# Patient Record
Sex: FEMALE | Race: WHITE | HISPANIC OR LATINO | Employment: UNEMPLOYED | URBAN - METROPOLITAN AREA
[De-identification: names, ages, dates, MRNs, and addresses within clinical notes are randomized per-mention and may not be internally consistent; named-entity substitution may affect disease eponyms.]

---
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
## 2020-01-13 ENCOUNTER — NON-PROVIDER VISIT (OUTPATIENT)
Dept: URGENT CARE | Facility: CLINIC | Age: 20
End: 2020-01-13
Payer: COMMERCIAL

## 2020-01-13 DIAGNOSIS — Z02.1 PRE-EMPLOYMENT DRUG SCREENING: ICD-10-CM

## 2020-01-13 LAB
AMP AMPHETAMINE: NORMAL
BAR BARBITURATES: NORMAL
BZO BENZODIAZEPINES: NORMAL
COC COCAINE: NORMAL
INT CON NEG: NORMAL
INT CON POS: NORMAL
MDMA ECSTASY: NORMAL
MET METHAMPHETAMINES: NORMAL
MTD METHADONE: NORMAL
OPI OPIATES: NORMAL
OXY OXYCODONE: NORMAL
PCP PHENCYCLIDINE: NORMAL
POC URINE DRUG SCREEN OCDRS: NORMAL
THC: NORMAL

## 2020-01-13 PROCEDURE — 80305 DRUG TEST PRSMV DIR OPT OBS: CPT | Performed by: FAMILY MEDICINE

## 2020-03-07 ENCOUNTER — OFFICE VISIT (OUTPATIENT)
Dept: URGENT CARE | Facility: CLINIC | Age: 20
End: 2020-03-07
Payer: COMMERCIAL

## 2020-03-07 VITALS
WEIGHT: 107 LBS | HEIGHT: 59 IN | DIASTOLIC BLOOD PRESSURE: 60 MMHG | OXYGEN SATURATION: 98 % | RESPIRATION RATE: 16 BRPM | BODY MASS INDEX: 21.57 KG/M2 | SYSTOLIC BLOOD PRESSURE: 96 MMHG | TEMPERATURE: 98.7 F | HEART RATE: 97 BPM

## 2020-03-07 DIAGNOSIS — R05.9 COUGH: ICD-10-CM

## 2020-03-07 DIAGNOSIS — J06.9 VIRAL URI WITH COUGH: ICD-10-CM

## 2020-03-07 PROCEDURE — 99214 OFFICE O/P EST MOD 30 MIN: CPT | Performed by: NURSE PRACTITIONER

## 2020-03-07 RX ORDER — PROMETHAZINE HYDROCHLORIDE AND CODEINE PHOSPHATE 6.25; 1 MG/5ML; MG/5ML
5 SYRUP ORAL 4 TIMES DAILY PRN
Qty: 120 ML | Refills: 0 | Status: SHIPPED | OUTPATIENT
Start: 2020-03-07 | End: 2020-03-14

## 2020-03-07 RX ORDER — BENZONATATE 100 MG/1
100 CAPSULE ORAL 3 TIMES DAILY PRN
Qty: 30 CAP | Refills: 0 | Status: SHIPPED | OUTPATIENT
Start: 2020-03-07 | End: 2020-03-26

## 2020-03-07 ASSESSMENT — ENCOUNTER SYMPTOMS
SHORTNESS OF BREATH: 0
VOMITING: 0
HEARTBURN: 0
CHILLS: 0
SPUTUM PRODUCTION: 0
HEMOPTYSIS: 0
RHINORRHEA: 0
NAUSEA: 0
ORTHOPNEA: 0
FEVER: 0
DIARRHEA: 0
SWEATS: 0
HEADACHES: 0
DIZZINESS: 0
SORE THROAT: 1
COUGH: 0
MYALGIAS: 0
CONSTIPATION: 0
MEMORY LOSS: 0
BACK PAIN: 0
WHEEZING: 0
WEIGHT LOSS: 0
TINGLING: 0
PALPITATIONS: 0

## 2020-03-07 ASSESSMENT — COPD QUESTIONNAIRES: COPD: 0

## 2020-03-07 NOTE — PROGRESS NOTES
"Subjective:      Odette Harris is a 19 y.o. female who presents with Cough (x3 days) and Sore Throat            Cough   This is a new problem. The current episode started in the past 7 days. The problem has been unchanged. The cough is non-productive. Associated symptoms include a sore throat. Pertinent negatives include no chest pain, chills, ear congestion, ear pain, fever, headaches, heartburn, hemoptysis, myalgias, nasal congestion, postnasal drip, rash, rhinorrhea, shortness of breath, sweats, weight loss or wheezing. The symptoms are aggravated by lying down. She has tried OTC cough suppressant for the symptoms. The treatment provided no relief. There is no history of asthma, bronchitis, COPD, environmental allergies or pneumonia.       Review of Systems   Constitutional: Negative for chills, fever, malaise/fatigue and weight loss.   HENT: Positive for congestion and sore throat. Negative for ear pain, postnasal drip and rhinorrhea.    Respiratory: Negative for cough, hemoptysis, sputum production, shortness of breath and wheezing.    Cardiovascular: Negative for chest pain, palpitations, orthopnea and leg swelling.   Gastrointestinal: Negative for constipation, diarrhea, heartburn, nausea and vomiting.   Musculoskeletal: Negative for back pain, joint pain and myalgias.   Skin: Negative for rash.   Neurological: Negative for dizziness, tingling and headaches.   Endo/Heme/Allergies: Negative for environmental allergies.   Psychiatric/Behavioral: Negative for memory loss and suicidal ideas.   All other systems reviewed and are negative.         Objective:     BP (!) 96/60 (BP Location: Right arm, Patient Position: Sitting, BP Cuff Size: Adult)   Pulse 97   Temp 37.1 °C (98.7 °F) (Temporal)   Resp 16   Ht 1.499 m (4' 11\")   Wt 48.5 kg (107 lb)   LMP 02/22/2020 (Exact Date)   SpO2 98%   Breastfeeding No   BMI 21.61 kg/m²      Physical Exam  Vitals signs reviewed.   Constitutional:       General: She " is not in acute distress.     Appearance: Normal appearance.   HENT:      Head: Normocephalic and atraumatic.      Right Ear: Tympanic membrane, ear canal and external ear normal.      Left Ear: Tympanic membrane, ear canal and external ear normal.      Nose: Congestion and rhinorrhea present. No mucosal edema.      Right Sinus: No maxillary sinus tenderness or frontal sinus tenderness.      Left Sinus: No maxillary sinus tenderness or frontal sinus tenderness.   Eyes:      Pupils: Pupils are equal, round, and reactive to light.   Neck:      Musculoskeletal: Normal range of motion and neck supple.   Cardiovascular:      Rate and Rhythm: Normal rate and regular rhythm.      Pulses: Normal pulses.      Heart sounds: No friction rub. No gallop.    Pulmonary:      Effort: Pulmonary effort is normal. No respiratory distress.      Breath sounds: Normal breath sounds.   Abdominal:      General: Bowel sounds are normal. There is no distension.      Palpations: Abdomen is soft. There is no mass.   Musculoskeletal: Normal range of motion.         General: No tenderness.      Right lower leg: No edema.      Left lower leg: No edema.   Skin:     General: Skin is warm and dry.   Neurological:      Mental Status: She is alert and oriented to person, place, and time.   Psychiatric:         Mood and Affect: Mood normal.         Behavior: Behavior normal.                 Assessment/Plan:       1. Cough  2. Viral URI with cough  Clinical history and physical exam consistent with probable viral infection. Patient without evidence of superimposed bacterial infection. Patient able to tolerate PO intake with adequate hydration status. Discussed with patient that there are no indications for antibiotics at this time, and they can expect the full course of this illness to run 7-10 days.  Patient education and return precautions provided.  - Symptomatic management  - Encourage PO intake  - Ibuprofen / Tylenol, PRN for fever, pain or  myalgias   - Robitussin DM / Cepacol / Saline Nasal Spray / Humidifier  -Honey or OTC guaifensen/dextromethorphan for cough  -Sudafed, oxymetazoline (Afrin, Zicam) caution given to not use oxymetazoline more than 3-4 days as rebound congestion my develop.   - Recommend follow-up if symptoms do not improve within 7-10 days or if they develop new or worsening symptoms.  Sedating effects of cough syrup discussed. Checked patient's  and find no evidence of narcotic misuse.    - benzonatate (TESSALON) 100 MG Cap; Take 1 Cap by mouth 3 times a day as needed for Cough.  Dispense: 30 Cap; Refill: 0  - promethazine-codeine (PHENERGAN-CODEINE) 6.25-10 MG/5ML Syrup; Take 5 mL by mouth 4 times a day as needed for Cough (Max 30 mls in 24 hrs.) for up to 7 days.  Dispense: 120 mL; Refill: 0

## 2020-03-26 ENCOUNTER — OCCUPATIONAL MEDICINE (OUTPATIENT)
Dept: URGENT CARE | Facility: PHYSICIAN GROUP | Age: 20
End: 2020-03-26
Payer: COMMERCIAL

## 2020-03-26 ENCOUNTER — HOSPITAL ENCOUNTER (OUTPATIENT)
Dept: RADIOLOGY | Facility: MEDICAL CENTER | Age: 20
End: 2020-03-26
Attending: NURSE PRACTITIONER
Payer: COMMERCIAL

## 2020-03-26 VITALS
HEIGHT: 59 IN | HEART RATE: 64 BPM | RESPIRATION RATE: 16 BRPM | BODY MASS INDEX: 21.77 KG/M2 | WEIGHT: 108 LBS | SYSTOLIC BLOOD PRESSURE: 110 MMHG | TEMPERATURE: 98 F | DIASTOLIC BLOOD PRESSURE: 60 MMHG | OXYGEN SATURATION: 100 %

## 2020-03-26 DIAGNOSIS — Z02.1 PRE-EMPLOYMENT DRUG SCREENING: ICD-10-CM

## 2020-03-26 DIAGNOSIS — M79.641 RIGHT HAND PAIN: ICD-10-CM

## 2020-03-26 DIAGNOSIS — S60.221A CONTUSION OF RIGHT HAND, INITIAL ENCOUNTER: ICD-10-CM

## 2020-03-26 LAB
AMP AMPHETAMINE: NORMAL
BAR BARBITURATES: NORMAL
BZO BENZODIAZEPINES: NORMAL
COC COCAINE: NORMAL
INT CON NEG: NEGATIVE
INT CON POS: POSITIVE
MDMA ECSTASY: NORMAL
MET METHAMPHETAMINES: NORMAL
MTD METHADONE: NORMAL
OPI OPIATES: NORMAL
OXY OXYCODONE: NORMAL
PCP PHENCYCLIDINE: NORMAL
POC URINE DRUG SCREEN OCDRS: NORMAL
THC: NORMAL

## 2020-03-26 PROCEDURE — 80305 DRUG TEST PRSMV DIR OPT OBS: CPT | Mod: 29 | Performed by: NURSE PRACTITIONER

## 2020-03-26 PROCEDURE — 99213 OFFICE O/P EST LOW 20 MIN: CPT | Mod: 29 | Performed by: NURSE PRACTITIONER

## 2020-03-26 PROCEDURE — 73130 X-RAY EXAM OF HAND: CPT | Mod: RT

## 2020-03-26 ASSESSMENT — PAIN SCALES - GENERAL: PAINLEVEL: 6=MODERATE PAIN

## 2020-03-26 ASSESSMENT — ENCOUNTER SYMPTOMS
TINGLING: 0
SENSORY CHANGE: 0
MYALGIAS: 1
FOCAL WEAKNESS: 0

## 2020-03-26 NOTE — PROGRESS NOTES
Subjective:      Odette Harris is a 19 y.o. female who presents with Hand Injury (DOI 3/25/2020 R hand )            HPI DOI: 3/25/20. Patient is 20 year old female presenting with right hand pain after hitting it hard against a table while working. She has 6/10 pain to dorsum of hand. No radiation of this pain at this time. She has no focal weakness, no paresthesia. She is right hand dominant. No second job and no previous history with this hand. She has not done any treatment for this.    Patient has no known allergies.  Current Outpatient Medications on File Prior to Visit   Medication Sig Dispense Refill   • benzonatate (TESSALON) 100 MG Cap Take 1 Cap by mouth 3 times a day as needed for Cough. (Patient not taking: Reported on 3/26/2020) 30 Cap 0     No current facility-administered medications on file prior to visit.      Social History     Socioeconomic History   • Marital status: Single     Spouse name: Not on file   • Number of children: Not on file   • Years of education: Not on file   • Highest education level: Not on file   Occupational History   • Not on file   Social Needs   • Financial resource strain: Not on file   • Food insecurity     Worry: Not on file     Inability: Not on file   • Transportation needs     Medical: Not on file     Non-medical: Not on file   Tobacco Use   • Smoking status: Never Smoker   • Smokeless tobacco: Never Used   Substance and Sexual Activity   • Alcohol use: Not Currently   • Drug use: Never   • Sexual activity: Not on file   Lifestyle   • Physical activity     Days per week: Not on file     Minutes per session: Not on file   • Stress: Not on file   Relationships   • Social connections     Talks on phone: Not on file     Gets together: Not on file     Attends Jainism service: Not on file     Active member of club or organization: Not on file     Attends meetings of clubs or organizations: Not on file     Relationship status: Not on file   • Intimate partner violence  "    Fear of current or ex partner: Not on file     Emotionally abused: Not on file     Physically abused: Not on file     Forced sexual activity: Not on file   Other Topics Concern   • Behavioral problems Not Asked   • Interpersonal relationships Not Asked   • Sad or not enjoying activities Not Asked   • Suicidal thoughts Not Asked   • Poor school performance Not Asked   • Reading difficulties Not Asked   • Speech difficulties Not Asked   • Writing difficulties Not Asked   • Inadequate sleep Not Asked   • Excessive TV viewing Not Asked   • Excessive video game use Not Asked   • Inadequate exercise Not Asked   • Sports related Not Asked   • Poor diet Not Asked   • Family concerns for drug/alcohol abuse Not Asked   • Poor oral hygiene Not Asked   • Bike safety Not Asked   • Family concerns vehicle safety Not Asked   Social History Narrative   • Not on file     Breast Cancer-related family history is not on file.      Review of Systems   Musculoskeletal: Positive for joint pain and myalgias.   Neurological: Negative for tingling, sensory change and focal weakness.          Objective:     /60   Pulse 64   Temp 36.7 °C (98 °F) (Temporal)   Resp 16   Ht 1.499 m (4' 11\")   Wt 49 kg (108 lb)   SpO2 100%   BMI 21.81 kg/m²      Physical Exam  Constitutional:       Appearance: Normal appearance. She is not ill-appearing.   Cardiovascular:      Rate and Rhythm: Normal rate and regular rhythm.      Heart sounds: No murmur.   Pulmonary:      Effort: Pulmonary effort is normal.      Breath sounds: Normal breath sounds.   Musculoskeletal:      Right hand: She exhibits tenderness, bony tenderness and swelling. She exhibits normal range of motion.        Hands:    Skin:     General: Skin is warm and dry.      Findings: Bruising present.   Neurological:      General: No focal deficit present.      Mental Status: She is alert and oriented to person, place, and time.   Psychiatric:         Mood and Affect: Mood normal. "                 Assessment/Plan:       1. Contusion of right hand, initial encounter     2. Right hand pain  DX-HAND 3+ RIGHT     Negative imaging.  nsaids and icing.  Restrictions per D39

## 2020-03-26 NOTE — LETTER
[facility]  [address] [place] [address]  Phone:  [phone] - Fax:  [phone]   [facility] Progress Report and Disability Certification  Date of Service: 3/26/2020   No Show:  No  Date / Time of Next Visit: 3/31/2020   Claim Information   Patient Name: Oedtte Harris  Claim Number:     Employer: [employer]  Date of Injury: 3/25/2020     Insurer / TPA: Marie Avila  ID / SSN:     Occupation:   Diagnosis: Diagnoses of Contusion of right hand, initial encounter and Right hand pain were pertinent to this visit.    Medical Information   Related to Industrial Injury? Yes    Subjective Complaints:  DOI: 3/25/20. Patient is 20 year old female presenting with right hand pain after hitting it hard against a table while working. She has 6/10 pain to dorsum of hand. No radiation of this pain at this time. She has no focal weakness, no paresthesia. She is right hand dominant. No second job and no previous history with this hand. She has not done any treatment for this.   Objective Findings: Physical Exam  Constitutional:       Appearance: Normal appearance. She is not ill-appearing.   Cardiovascular:      Rate and Rhythm: Normal rate and regular rhythm.      Heart sounds: No murmur.   Pulmonary:      Effort: Pulmonary effort is normal.      Breath sounds: Normal breath sounds.   Musculoskeletal:      Right hand: She exhibits tenderness, bony tenderness and swelling. She exhibits normal range of motion.        Hands:    Skin:     General: Skin is warm and dry.      Findings: Bruising present.   Neurological:      General: No focal deficit present.      Mental Status: She is alert and oriented to person, place, and time.   Psychiatric:         Mood and Affect: Mood normal.        Pre-Existing Condition(s):     Assessment:   Initial Visit    Status: Additional Care Required  Permanent Disability:No    Plan: Medication    Diagnostics: X-ray    Comments:   Imaging negative for fracture    Disability Information   Status: Released to Restricted Duty    From:  3/26/2020  Through: 3/31/2020 Restrictions are: Temporary   Physical Restrictions   Sitting:    Standing:    Stooping:    Bending:      Squatting:    Walking:    Climbing:    Pushing:      Pulling:    Other:    Reaching Above Shoulder (L):   Reaching Above Shoulder (R):       Reaching Below Shoulder (L):    Reaching Below Shoulder (R):      Not to exceed Weight Limits   Carrying(hrs):   Weight Limit(lb):   Lifting(hrs):   Weight  Limit(lb):     Comments:      Repetitive Actions   Hands: i.e. Fine Manipulations from [place] hrs/day  Comments:right hand.   Feet: i.e. Operating Foot Controls:     Driving / Operate Machinery:     Physician Name: OMID CorralesPALFONSO Physician Signature: CRYSTAL Wilson e-Signature: Dr. Hilario Soto, Medical Director   Clinic Name / Location: [address] [address] Clinic Phone Number: Dept: [phone]   Appointment Time: 3:25 Pm Visit Start Time: 3:56 PM   Check-In Time:  3:39 Pm Visit Discharge Time:  4:45 PM   Original-Treating Physician or Chiropractor    Page 2-Insurer/TPA    Page 3-Employer    Page 4-Employee

## 2020-03-26 NOTE — LETTER
"EMPLOYEE’S CLAIM FOR COMPENSATION/ REPORT OF INITIAL TREATMENT  FORM C-4    EMPLOYEE’S CLAIM - PROVIDE ALL INFORMATION REQUESTED   First Name  Odette Last Name  Steven Birthdate                    2000                Sex  female Claim Number   Home Address  [address] Age  19 y.o. Height  1.499 m (4' 11\") Weight  49 kg (108 lb) N     [facility] Zip  [address] Telephone  [phone] (home)    Mailing Address  [address] [facility] [place]  [address] Primary Language Spoken  English    Insurer  [employer]   [employer]   Employee's Occupation (Job Title) When Injury or Occupational Disease Occurred      Employer's Name  [employer]  Telephone  [phone]    Employer Address  [address] [facility] [facility] [address]  Zip  [address]    Date of Injury  3/25/2020               Hour of Injury  4:00 PM Date Employer Notified  3/26/2020 Last Day of Work after Injury or Occupational Disease  3/26/2020 Supervisor to Whom Injury Reported  Monica   Address or Location of Accident (if applicable)  [At Work]   What were you doing at the time of accident? (if applicable)  working hit hand against table    How did this injury or occupational disease occur? (Be specific an answer in detail. Use additional sheet if necessary)  Was Working Fast, Hit my hand against table hard.   If you believe that you have an occupational disease, when did you first have knowledge of the disability and it relationship to your employment?  N/A Witnesses to the Accident  N/A      Nature of Injury or Occupational Disease  Workers' Compensation  Part(s) of Body Injured or Affected  Hand (R), N/A, N/A    I certify that the above is true and correct to the best of my knowledge and that I have provided this information in order to obtain the benefits of [place]’s [place] and " [place]  I hereby authorize any physician, chiropractor, surgeon, practitioner, or other person, any hospital, including [facility] or [facility], any medical service organization, any insurance company, or other institution or organization to release to each other, any medical or other information, including benefits paid or payable, pertinent to this injury or disease, except information relative to diagnosis, treatment and/or counseling for AIDS, psychological conditions, alcohol or controlled substances, for which I must give specific authorization.  A Photostat of this authorization shall be as valid as the original.     Date   Place   Employee’s Signature   THIS REPORT MUST BE COMPLETED AND MAILED WITHIN 3 WORKING DAYS OF TREATMENT   Place  [facility]  Name of Facility  [place]   Date  3/26/2020 Diagnosis  (S60.221A) Contusion of right hand, initial encounter  (M79.641) Right hand pain Is there evidence the injured employee was under the influence of alcohol and/or another controlled substance at the time of accident?   Hour  3:56 PM Description of Injury or Disease  Diagnoses of Contusion of right hand, initial encounter and Right hand pain were pertinent to this visit. No   Treatment  nsaids and icing.  Have you advised the patient to remain off work five days or more? No   X-Ray Findings  Negative   If Yes   From Date  To Date      From information given by the employee, together with medical evidence, can you directly connect this injury or occupational disease as job incurred?  Yes If No Full Duty No Modified Duty  Yes   Is additional medical care by a physician indicated?  Yes If Modified Duty, Specify any Limitations / Restrictions  Restricted work with right hand.   Do you know of any previous injury or disease contributing to this condition or occupational disease?                            No    "  Date  3/26/2020 Print Doctor’s Name WALI Corrales I certify the employer’s copy of  this form was mailed on:   Address  [address] Insurer’s Use Only     [facility] Zip  [address]    Provider’s Tax ID Number  [id number] Telephone  Dept: [phone]        e-CRYSTAL Bustos   e-Signature: Dr. Hilario Soto,   Medical Director Degree  MD        ORIGINAL-TREATING PHYSICIAN OR CHIROPRACTOR    PAGE 2-INSURER/TPA    PAGE 3-EMPLOYER    PAGE 4-EMPLOYEE             Form C-4 (rev.10/07)              BRIEF DESCRIPTION OF RIGHTS AND BENEFITS  (Pursuant to [place] 616C.050)    Notice of Injury or Occupational Disease (Incident Report Form C-1): If an injury or occupational disease (OD) arises out of and in the course of employment, you must provide written notice to your employer as soon as practicable, but no later than 7 days after the accident or OD. Your employer shall maintain a sufficient supply of the required forms.    Claim for Compensation (Form C-4): If medical treatment is sought, the form C-4 is available at the place of initial treatment. A completed \"Claim for Compensation\" (Form C-4) must be filed within 90 days after an accident or OD. The treating physician or chiropractor must, within 3 working days after treatment, complete and mail to the employer, the employer's insurer and third-party , the Claim for Compensation.    Medical Treatment: If you require medical treatment for your on-the-job injury or OD, you may be required to select a physician or chiropractor from a list provided by your workers’ compensation insurer, if it has contracted with an Organization for Managed Care (MCO) or Preferred Provider Organization (PPO) or providers of health care. If your employer has not entered into a contract with an MCO or PPO, you may select a physician or chiropractor from the Panel of Physicians and Chiropractors. Any medical costs related to your industrial " injury or OD will be paid by your insurer.    Temporary Total Disability (TTD): If your doctor has certified that you are unable to work for a period of at least 5 consecutive days, or 5 cumulative days in a 20-day period, or places restrictions on you that your employer does not accommodate, you may be entitled to TTD compensation.    Temporary Partial Disability (TPD): If the wage you receive upon reemployment is less than the compensation for TTD to which you are entitled, the insurer may be required to pay you TPD compensation to make up the difference. TPD can only be paid for a maximum of 24 months.    Permanent Partial Disability (PPD): When your medical condition is stable and there is an indication of a PPD as a result of your injury or OD, within 30 days, your insurer must arrange for an evaluation by a rating physician or chiropractor to determine the degree of your PPD. The amount of your PPD award depends on the date of injury, the results of the PPD evaluation and your age and wage.    Permanent Total Disability (PTD): If you are medically certified by a treating physician or chiropractor as permanently and totally disabled and have been granted a PTD status by your insurer, you are entitled to receive monthly benefits not to exceed 66 2/3% of your average monthly wage. The amount of your PTD payments is subject to reduction if you previously received a PPD award.    Vocational Rehabilitation Services: You may be eligible for vocational rehabilitation services if you are unable to return to the job due to a permanent physical impairment or permanent restrictions as a result of your injury or occupational disease.    Transportation and Per Rusty Reimbursement: You may be eligible for travel expenses and per rusty associated with medical treatment.    Reopening: You may be able to reopen your claim if your condition worsens after claim closure.    Appeal Process: If you disagree with a written  determination issued by the insurer or the insurer does not respond to your request, you may appeal to the Department of Administration, , by following the instructions contained in your determination letter. You must appeal the determination within 70 days from the date of the determination letter at [address], [address], [place], [place] [address], or [address], [address], [place], [place] [address]. If you disagree with the  decision, you may appeal to the Department of Administration, . You must file your appeal within 30 days from the date of the  decision letter at [address], [address], [place], [place] [address], or [address], [address], [place], [place] [address]. If you disagree with a decision of an , you may file a petition for judicial review with the District Court. You must do so within 30 days of the Appeal Officer’s decision. You may be represented by an  at your own expense or you may contact the [facility] for possible representation.    [place]  for Injured Workers (NAIW): If you disagree with a  decision, you may request that NAIW represent you without charge at an  Hearing. For information regarding denial of benefits, you may contact the [facility] at: [address], [address], [place] [address], [phone], or [address], [address], [place] [address], [phone]    To File a Complaint with the Division: If you wish to file a complaint with the  of the Division of Industrial Relations (DIR),  please contact the Workers’ Compensation Section, [address], [address], [place], [place] [address], telephone [phone] [phone], or [address], [address], [place], [place] [address], telephone [phone].    For assistance with Workers’ Compensation Issues: You may contact the Office of the  [facility] Consumer Health Assistance, [address], [address], [place] [address], Toll Free 1-432.800.9109, Web site: http://govcha.[facility].[place].us, E-mail [email or web address].                   __________________________________________________________________                                                     _________        Employee Name / Signature                                                                                                                                              Date                                                                                                                                                                                                     D-2 (rev. 06/18)

## 2020-03-31 ENCOUNTER — OCCUPATIONAL MEDICINE (OUTPATIENT)
Dept: URGENT CARE | Facility: PHYSICIAN GROUP | Age: 20
End: 2020-03-31
Payer: COMMERCIAL

## 2020-03-31 VITALS
HEIGHT: 59 IN | DIASTOLIC BLOOD PRESSURE: 58 MMHG | BODY MASS INDEX: 21.77 KG/M2 | WEIGHT: 108 LBS | TEMPERATURE: 97.7 F | HEART RATE: 75 BPM | RESPIRATION RATE: 16 BRPM | SYSTOLIC BLOOD PRESSURE: 104 MMHG | OXYGEN SATURATION: 99 %

## 2020-03-31 DIAGNOSIS — S60.221D CONTUSION OF RIGHT HAND, SUBSEQUENT ENCOUNTER: ICD-10-CM

## 2020-03-31 PROCEDURE — 99213 OFFICE O/P EST LOW 20 MIN: CPT | Performed by: NURSE PRACTITIONER

## 2020-03-31 ASSESSMENT — ENCOUNTER SYMPTOMS
FEVER: 0
SENSORY CHANGE: 0
TINGLING: 0
CHILLS: 0
MYALGIAS: 0
BRUISES/BLEEDS EASILY: 0
FALLS: 0
WEAKNESS: 0

## 2020-03-31 ASSESSMENT — PAIN SCALES - GENERAL: PAINLEVEL: NO PAIN

## 2020-03-31 NOTE — LETTER
[facility]  [address]  [place] [address]  Phone:  [phone] - Fax:  [phone]   [facility] Progress Report and Disability Certification  Date of Service: 3/31/2020   No Show:  No  Date / Time of Next Visit:  Discharged/MMI   Claim Information   Patient Name: Odette Harris  Claim Number:     Employer: [employer]  Date of Injury: 3/25/2020     Insurer / TPA: Marie Avila  ID / SSN:     Occupation:   Diagnosis: The encounter diagnosis was Contusion of right hand, subsequent encounter.    Medical Information   Related to Industrial Injury? Yes    Subjective Complaints:  DOI 3/25/20. Right hand dorsal contusion has improved. States bruising has improved. Denies pain, numbness/tingling, no weakness in hand. Had performed regular duties yesterday with no difficulty or pain. States ready to return to work, no restrictions.   Objective Findings: A/O x 3. Skin p/w/d, skin sensation intact. Equal hand . No swelling, redness, bruising or deformity.    Pre-Existing Condition(s):     Assessment:   Condition Improved    Status: Discharged /  MMI  Permanent Disability:No    Plan:      Diagnostics:      Comments:       Disability Information   Status: Released to Full Duty    From:     Through:   Restrictions are:     Physical Restrictions   Sitting:    Standing:    Stooping:    Bending:      Squatting:    Walking:    Climbing:    Pushing:      Pulling:    Other:    Reaching Above Shoulder (L):   Reaching Above Shoulder (R):       Reaching Below Shoulder (L):    Reaching Below Shoulder (R):      Not to exceed Weight Limits   Carrying(hrs):   Weight Limit(lb):   Lifting(hrs):   Weight  Limit(lb):     Comments: Discharged/MMI.    Repetitive Actions   Hands: i.e. Fine Manipulations from Grasping:     Feet: i.e. Operating Foot Controls:     Driving / Operate Machinery:     Physician Name: HARVINDER Weir Physician Signature: Ella  DB BLANTON e-Signature: Dr. Hilario Soto, Medical Director   Clinic Name / Location: [address] [address] Clinic Phone Number: Dept: [phone]   Appointment Time: 8:20 Am Visit Start Time: 8:23 AM   Check-In Time:  8:19 Am Visit Discharge Time: 8:50 AM   Original-Treating Physician or Chiropractor    Page 2-Insurer/TPA    Page 3-Employer    Page 4-Employee

## 2020-03-31 NOTE — PROGRESS NOTES
"Subjective:      Odette Harris is a 19 y.o. female who presents with Hand Injury (DOI 3/25/20 R hand better)      DOI 3/25/20. Right hand dorsal contusion has improved. States bruising has improved. Denies pain, numbness/tingling, no weakness in hand. Had performed regular duties yesterday with no difficulty or pain. States ready to return to work, no restrictions.     HPI  PMH: No pertinent past medical history to this problem  MEDS: Medications were reviewed in Epic  ALLERGIES: Allergies were reviewed in Epic  FH: No pertinent family history to this problem         Review of Systems   Constitutional: Negative for chills, fever and malaise/fatigue.   Musculoskeletal: Negative for falls, joint pain and myalgias.   Skin: Negative for itching and rash.   Neurological: Negative for tingling, sensory change and weakness.   Endo/Heme/Allergies: Does not bruise/bleed easily.   All other systems reviewed and are negative.         Objective:     /58   Pulse 75   Temp 36.5 °C (97.7 °F) (Temporal)   Resp 16   Ht 1.499 m (4' 11\")   Wt 49 kg (108 lb)   SpO2 99%   BMI 21.81 kg/m²      Physical Exam  Vitals signs reviewed.   Constitutional:       General: She is awake. She is not in acute distress.     Appearance: Normal appearance. She is well-developed. She is not ill-appearing, toxic-appearing or diaphoretic.   HENT:      Head: Normocephalic.   Eyes:      Pupils: Pupils are equal, round, and reactive to light.   Cardiovascular:      Rate and Rhythm: Normal rate.   Pulmonary:      Effort: Pulmonary effort is normal.   Musculoskeletal:      Right hand: She exhibits normal range of motion, no tenderness, normal two-point discrimination, normal capillary refill, no deformity, no laceration and no swelling. Normal sensation noted. Normal strength noted.   Skin:     General: Skin is warm and dry.      Findings: No abrasion, bruising, ecchymosis, erythema, signs of injury, laceration, rash or wound.   Neurological: "      Mental Status: She is alert and oriented to person, place, and time.   Psychiatric:         Behavior: Behavior is cooperative.         A/O x 3. Skin p/w/d, skin sensation intact. Equal hand . No swelling, redness, bruising or deformity.        Assessment/Plan:       1. Contusion of right hand, subsequent encounter    Discharged/MMI

## 2021-10-14 ENCOUNTER — HOSPITAL ENCOUNTER (OUTPATIENT)
Facility: MEDICAL CENTER | Age: 21
End: 2021-10-14
Attending: PHYSICIAN ASSISTANT
Payer: COMMERCIAL

## 2021-10-14 ENCOUNTER — OFFICE VISIT (OUTPATIENT)
Dept: URGENT CARE | Facility: CLINIC | Age: 21
End: 2021-10-14
Payer: COMMERCIAL

## 2021-10-14 VITALS
TEMPERATURE: 97.8 F | WEIGHT: 115.8 LBS | OXYGEN SATURATION: 96 % | HEART RATE: 96 BPM | HEIGHT: 59 IN | DIASTOLIC BLOOD PRESSURE: 62 MMHG | BODY MASS INDEX: 23.35 KG/M2 | SYSTOLIC BLOOD PRESSURE: 102 MMHG

## 2021-10-14 DIAGNOSIS — N30.01 ACUTE CYSTITIS WITH HEMATURIA: ICD-10-CM

## 2021-10-14 LAB
APPEARANCE UR: ABNORMAL
BILIRUB UR STRIP-MCNC: ABNORMAL MG/DL
COLOR UR AUTO: ABNORMAL
GLUCOSE UR STRIP.AUTO-MCNC: NEGATIVE MG/DL
INT CON NEG: NEGATIVE
INT CON POS: POSITIVE
KETONES UR STRIP.AUTO-MCNC: 80 MG/DL
LEUKOCYTE ESTERASE UR QL STRIP.AUTO: ABNORMAL
NITRITE UR QL STRIP.AUTO: POSITIVE
PH UR STRIP.AUTO: 8.5 [PH] (ref 5–8)
POC URINE PREGNANCY TEST: NEGATIVE
PROT UR QL STRIP: >=300 MG/DL
RBC UR QL AUTO: ABNORMAL
SP GR UR STRIP.AUTO: 1.02
UROBILINOGEN UR STRIP-MCNC: 2 MG/DL

## 2021-10-14 PROCEDURE — 99213 OFFICE O/P EST LOW 20 MIN: CPT | Performed by: PHYSICIAN ASSISTANT

## 2021-10-14 PROCEDURE — 81025 URINE PREGNANCY TEST: CPT | Performed by: PHYSICIAN ASSISTANT

## 2021-10-14 PROCEDURE — 87077 CULTURE AEROBIC IDENTIFY: CPT

## 2021-10-14 PROCEDURE — 81002 URINALYSIS NONAUTO W/O SCOPE: CPT | Performed by: PHYSICIAN ASSISTANT

## 2021-10-14 PROCEDURE — 87086 URINE CULTURE/COLONY COUNT: CPT

## 2021-10-14 PROCEDURE — 87186 SC STD MICRODIL/AGAR DIL: CPT

## 2021-10-14 RX ORDER — NITROFURANTOIN 25; 75 MG/1; MG/1
100 CAPSULE ORAL 2 TIMES DAILY
Qty: 10 CAPSULE | Refills: 0 | Status: SHIPPED | OUTPATIENT
Start: 2021-10-14 | End: 2021-10-19

## 2021-10-14 ASSESSMENT — ENCOUNTER SYMPTOMS
SHORTNESS OF BREATH: 0
NAUSEA: 0
CHILLS: 0
VOMITING: 0
FLANK PAIN: 0
FEVER: 0
PALPITATIONS: 0
DIZZINESS: 0
ABDOMINAL PAIN: 1
BLURRED VISION: 0

## 2021-10-14 NOTE — PROGRESS NOTES
"Jose Manuel Harris is a 21 y.o. female who presents with Dysuria (x 3 days with abdominal pain)    UTI  The current episode started in the past 7 days (started ~ 2 days ago but got worse yesterday). The problem occurs constantly. Associated symptoms include abdominal pain and urinary symptoms (dysuria, urgency, frequency). Pertinent negatives include no chest pain, chills, fever, nausea or vomiting. Nothing aggravates the symptoms. She has tried drinking, NSAIDs and heat for the symptoms. The treatment provided mild relief.   She also notes that she started her menstrual cycle yesterday.    Review of Systems   Constitutional: Negative for chills, fever and malaise/fatigue.   Eyes: Negative for blurred vision.   Respiratory: Negative for shortness of breath.    Cardiovascular: Negative for chest pain and palpitations.   Gastrointestinal: Positive for abdominal pain. Negative for nausea and vomiting.   Genitourinary: Positive for dysuria, frequency and urgency. Negative for flank pain.   Neurological: Negative for dizziness.       PMH:  has no past medical history on file.  MEDS:   Current Outpatient Medications:   •  nitrofurantoin (MACROBID) 100 MG Cap, Take 1 Capsule by mouth 2 times a day for 5 days., Disp: 10 Capsule, Rfl: 0  ALLERGIES: No Known Allergies  SURGHX: History reviewed. No pertinent surgical history.  SOCHX:  reports that she has never smoked. She has never used smokeless tobacco. She reports previous alcohol use. She reports that she does not use drugs.  FH: Family history was reviewed, no pertinent findings to report      Objective     /62 (BP Location: Left arm, Patient Position: Sitting, BP Cuff Size: Adult)   Pulse 96   Temp 36.6 °C (97.8 °F) (Temporal)   Ht 1.499 m (4' 11\")   Wt 52.5 kg (115 lb 12.8 oz)   SpO2 96%   BMI 23.39 kg/m²      Physical Exam  Constitutional:       Appearance: Normal appearance. She is well-developed.   HENT:      Head: Normocephalic and " atraumatic.      Right Ear: External ear normal.      Left Ear: External ear normal.   Eyes:      Conjunctiva/sclera: Conjunctivae normal.      Pupils: Pupils are equal, round, and reactive to light.   Cardiovascular:      Rate and Rhythm: Normal rate and regular rhythm.      Heart sounds: No murmur heard.     Pulmonary:      Effort: Pulmonary effort is normal.      Breath sounds: Normal breath sounds.   Abdominal:      Palpations: Abdomen is soft.      Tenderness: There is abdominal tenderness in the suprapubic area. There is no right CVA tenderness or left CVA tenderness.   Skin:     General: Skin is warm and dry.      Capillary Refill: Capillary refill takes less than 2 seconds.   Neurological:      Mental Status: She is alert and oriented to person, place, and time.   Psychiatric:         Behavior: Behavior normal.         Judgment: Judgment normal.       POCT Urinalysis  Lab Results   Component Value Date/Time    POCCOLOR Red 10/14/2021 02:29 PM    POCAPPEAR Turbid 10/14/2021 02:29 PM    POCLEUKEST Trace 10/14/2021 02:29 PM    POCNITRITE Positive 10/14/2021 02:29 PM    POCUROBILIGE 2.0 10/14/2021 02:29 PM    POCPROTEIN >=300 10/14/2021 02:29 PM    POCURPH 8.5 (A) 10/14/2021 02:29 PM    POCBLOOD Large 10/14/2021 02:29 PM    POCSPGRV 1.025 10/14/2021 02:29 PM    POCKETONES 80 10/14/2021 02:29 PM    POCBILIRUBIN Moderate 10/14/2021 02:29 PM    POCGLUCUA Negative 10/14/2021 02:29 PM      POCT Pregnancy - Negative    Assessment & Plan     1. Acute cystitis with hematuria  - POCT Pregnancy  - POCT Urinalysis  - URINE CULTURE(NEW); Future  - nitrofurantoin (MACROBID) 100 MG Cap; Take 1 Capsule by mouth 2 times a day for 5 days.  Dispense: 10 Capsule; Refill: 0             Differential Diagnosis, natural history, and supportive care discussed. Return to the Urgent Care or follow up with your PCP if symptoms fail to resolve, or for any new or worsening symptoms. Emergency room precautions discussed. Patient and/or  family appears understanding of information.

## 2021-10-17 LAB
BACTERIA UR CULT: ABNORMAL
BACTERIA UR CULT: ABNORMAL
SIGNIFICANT IND 70042: ABNORMAL
SITE SITE: ABNORMAL
SOURCE SOURCE: ABNORMAL

## 2022-11-08 ENCOUNTER — PRE-ADMISSION TESTING (OUTPATIENT)
Dept: ADMISSIONS | Facility: MEDICAL CENTER | Age: 22
End: 2022-11-08
Attending: OBSTETRICS & GYNECOLOGY
Payer: COMMERCIAL

## 2022-11-10 ENCOUNTER — ANESTHESIA EVENT (OUTPATIENT)
Dept: OBGYN | Facility: MEDICAL CENTER | Age: 22
End: 2022-11-10
Payer: COMMERCIAL

## 2022-11-11 ENCOUNTER — ANESTHESIA (OUTPATIENT)
Dept: OBGYN | Facility: MEDICAL CENTER | Age: 22
End: 2022-11-11
Payer: COMMERCIAL

## 2022-11-11 ENCOUNTER — HOSPITAL ENCOUNTER (INPATIENT)
Facility: MEDICAL CENTER | Age: 22
LOS: 3 days | End: 2022-11-14
Attending: OBSTETRICS & GYNECOLOGY | Admitting: OBSTETRICS & GYNECOLOGY
Payer: COMMERCIAL

## 2022-11-11 DIAGNOSIS — G89.18 CHEST WALL PAIN FOLLOWING SURGERY: ICD-10-CM

## 2022-11-11 DIAGNOSIS — G89.18 POSTOPERATIVE PAIN: ICD-10-CM

## 2022-11-11 DIAGNOSIS — R07.89 CHEST WALL PAIN FOLLOWING SURGERY: ICD-10-CM

## 2022-11-11 PROBLEM — Z33.1 IUP (INTRAUTERINE PREGNANCY), INCIDENTAL: Status: ACTIVE | Noted: 2022-11-11

## 2022-11-11 LAB
BASOPHILS # BLD AUTO: 0.3 % (ref 0–1.8)
BASOPHILS # BLD: 0.03 K/UL (ref 0–0.12)
EOSINOPHIL # BLD AUTO: 0.05 K/UL (ref 0–0.51)
EOSINOPHIL NFR BLD: 0.5 % (ref 0–6.9)
ERYTHROCYTE [DISTWIDTH] IN BLOOD BY AUTOMATED COUNT: 42.3 FL (ref 35.9–50)
ERYTHROCYTE [DISTWIDTH] IN BLOOD BY AUTOMATED COUNT: 42.7 FL (ref 35.9–50)
HCT VFR BLD AUTO: 29.8 % (ref 37–47)
HCT VFR BLD AUTO: 38.9 % (ref 37–47)
HGB BLD-MCNC: 10.3 G/DL (ref 12–16)
HGB BLD-MCNC: 13 G/DL (ref 12–16)
HOLDING TUBE BB 8507: NORMAL
IMM GRANULOCYTES # BLD AUTO: 0.05 K/UL (ref 0–0.11)
IMM GRANULOCYTES NFR BLD AUTO: 0.5 % (ref 0–0.9)
LYMPHOCYTES # BLD AUTO: 2.13 K/UL (ref 1–4.8)
LYMPHOCYTES NFR BLD: 20.8 % (ref 22–41)
MCH RBC QN AUTO: 28.8 PG (ref 27–33)
MCH RBC QN AUTO: 29.7 PG (ref 27–33)
MCHC RBC AUTO-ENTMCNC: 33.4 G/DL (ref 33.6–35)
MCHC RBC AUTO-ENTMCNC: 34.6 G/DL (ref 33.6–35)
MCV RBC AUTO: 85.9 FL (ref 81.4–97.8)
MCV RBC AUTO: 86.3 FL (ref 81.4–97.8)
MONOCYTES # BLD AUTO: 0.61 K/UL (ref 0–0.85)
MONOCYTES NFR BLD AUTO: 5.9 % (ref 0–13.4)
NEUTROPHILS # BLD AUTO: 7.39 K/UL (ref 2–7.15)
NEUTROPHILS NFR BLD: 72 % (ref 44–72)
NRBC # BLD AUTO: 0 K/UL
NRBC BLD-RTO: 0 /100 WBC
PLATELET # BLD AUTO: 220 K/UL (ref 164–446)
PLATELET # BLD AUTO: 255 K/UL (ref 164–446)
PMV BLD AUTO: 9.8 FL (ref 9–12.9)
PMV BLD AUTO: 9.9 FL (ref 9–12.9)
RBC # BLD AUTO: 3.47 M/UL (ref 4.2–5.4)
RBC # BLD AUTO: 4.51 M/UL (ref 4.2–5.4)
T PALLIDUM AB SER QL IA: NORMAL
WBC # BLD AUTO: 10.3 K/UL (ref 4.8–10.8)
WBC # BLD AUTO: 11.5 K/UL (ref 4.8–10.8)

## 2022-11-11 PROCEDURE — 700105 HCHG RX REV CODE 258: Performed by: ANESTHESIOLOGY

## 2022-11-11 PROCEDURE — 700105 HCHG RX REV CODE 258: Performed by: STUDENT IN AN ORGANIZED HEALTH CARE EDUCATION/TRAINING PROGRAM

## 2022-11-11 PROCEDURE — 160009 HCHG ANES TIME/MIN: Performed by: OBSTETRICS & GYNECOLOGY

## 2022-11-11 PROCEDURE — 160002 HCHG RECOVERY MINUTES (STAT): Performed by: OBSTETRICS & GYNECOLOGY

## 2022-11-11 PROCEDURE — A9270 NON-COVERED ITEM OR SERVICE: HCPCS | Performed by: ANESTHESIOLOGY

## 2022-11-11 PROCEDURE — 700111 HCHG RX REV CODE 636 W/ 250 OVERRIDE (IP): Performed by: ANESTHESIOLOGY

## 2022-11-11 PROCEDURE — 01961 ANES CESAREAN DELIVERY ONLY: CPT | Performed by: ANESTHESIOLOGY

## 2022-11-11 PROCEDURE — 86780 TREPONEMA PALLIDUM: CPT

## 2022-11-11 PROCEDURE — 160041 HCHG SURGERY MINUTES - EA ADDL 1 MIN LEVEL 4: Performed by: OBSTETRICS & GYNECOLOGY

## 2022-11-11 PROCEDURE — 700111 HCHG RX REV CODE 636 W/ 250 OVERRIDE (IP): Performed by: OBSTETRICS & GYNECOLOGY

## 2022-11-11 PROCEDURE — 700101 HCHG RX REV CODE 250: Performed by: ANESTHESIOLOGY

## 2022-11-11 PROCEDURE — A9270 NON-COVERED ITEM OR SERVICE: HCPCS | Performed by: STUDENT IN AN ORGANIZED HEALTH CARE EDUCATION/TRAINING PROGRAM

## 2022-11-11 PROCEDURE — 700111 HCHG RX REV CODE 636 W/ 250 OVERRIDE (IP): Performed by: STUDENT IN AN ORGANIZED HEALTH CARE EDUCATION/TRAINING PROGRAM

## 2022-11-11 PROCEDURE — 85025 COMPLETE CBC W/AUTO DIFF WBC: CPT

## 2022-11-11 PROCEDURE — 85027 COMPLETE CBC AUTOMATED: CPT

## 2022-11-11 PROCEDURE — 770002 HCHG ROOM/CARE - OB PRIVATE (112)

## 2022-11-11 PROCEDURE — 160035 HCHG PACU - 1ST 60 MINS PHASE I: Performed by: OBSTETRICS & GYNECOLOGY

## 2022-11-11 PROCEDURE — 160048 HCHG OR STATISTICAL LEVEL 1-5: Performed by: OBSTETRICS & GYNECOLOGY

## 2022-11-11 PROCEDURE — C1755 CATHETER, INTRASPINAL: HCPCS | Performed by: OBSTETRICS & GYNECOLOGY

## 2022-11-11 PROCEDURE — 700102 HCHG RX REV CODE 250 W/ 637 OVERRIDE(OP): Performed by: STUDENT IN AN ORGANIZED HEALTH CARE EDUCATION/TRAINING PROGRAM

## 2022-11-11 PROCEDURE — 160029 HCHG SURGERY MINUTES - 1ST 30 MINS LEVEL 4: Performed by: OBSTETRICS & GYNECOLOGY

## 2022-11-11 PROCEDURE — 700105 HCHG RX REV CODE 258: Performed by: OBSTETRICS & GYNECOLOGY

## 2022-11-11 PROCEDURE — 700102 HCHG RX REV CODE 250 W/ 637 OVERRIDE(OP): Performed by: ANESTHESIOLOGY

## 2022-11-11 PROCEDURE — 36415 COLL VENOUS BLD VENIPUNCTURE: CPT

## 2022-11-11 RX ORDER — HYDROMORPHONE HYDROCHLORIDE 1 MG/ML
0.2 INJECTION, SOLUTION INTRAMUSCULAR; INTRAVENOUS; SUBCUTANEOUS
Status: DISCONTINUED | OUTPATIENT
Start: 2022-11-11 | End: 2022-11-11 | Stop reason: HOSPADM

## 2022-11-11 RX ORDER — OXYCODONE HYDROCHLORIDE 5 MG/1
5 TABLET ORAL EVERY 4 HOURS PRN
Status: DISPENSED | OUTPATIENT
Start: 2022-11-11 | End: 2022-11-12

## 2022-11-11 RX ORDER — DIPHENHYDRAMINE HYDROCHLORIDE 50 MG/ML
25 INJECTION INTRAMUSCULAR; INTRAVENOUS EVERY 6 HOURS PRN
Status: ACTIVE | OUTPATIENT
Start: 2022-11-11 | End: 2022-11-12

## 2022-11-11 RX ORDER — ONDANSETRON 2 MG/ML
4 INJECTION INTRAMUSCULAR; INTRAVENOUS
Status: DISCONTINUED | OUTPATIENT
Start: 2022-11-11 | End: 2022-11-11 | Stop reason: HOSPADM

## 2022-11-11 RX ORDER — SODIUM CHLORIDE, SODIUM LACTATE, POTASSIUM CHLORIDE, CALCIUM CHLORIDE 600; 310; 30; 20 MG/100ML; MG/100ML; MG/100ML; MG/100ML
INJECTION, SOLUTION INTRAVENOUS CONTINUOUS
Status: DISCONTINUED | OUTPATIENT
Start: 2022-11-11 | End: 2022-11-14 | Stop reason: HOSPADM

## 2022-11-11 RX ORDER — HALOPERIDOL 5 MG/ML
1 INJECTION INTRAMUSCULAR
Status: DISCONTINUED | OUTPATIENT
Start: 2022-11-11 | End: 2022-11-11 | Stop reason: HOSPADM

## 2022-11-11 RX ORDER — OXYCODONE HCL 5 MG/5 ML
5 SOLUTION, ORAL ORAL
Status: DISCONTINUED | OUTPATIENT
Start: 2022-11-11 | End: 2022-11-11 | Stop reason: HOSPADM

## 2022-11-11 RX ORDER — DIPHENHYDRAMINE HCL 25 MG
25 TABLET ORAL EVERY 6 HOURS PRN
Status: DISCONTINUED | OUTPATIENT
Start: 2022-11-12 | End: 2022-11-14 | Stop reason: HOSPADM

## 2022-11-11 RX ORDER — HYDROMORPHONE HYDROCHLORIDE 1 MG/ML
0.1 INJECTION, SOLUTION INTRAMUSCULAR; INTRAVENOUS; SUBCUTANEOUS
Status: DISCONTINUED | OUTPATIENT
Start: 2022-11-11 | End: 2022-11-11 | Stop reason: HOSPADM

## 2022-11-11 RX ORDER — KETOROLAC TROMETHAMINE 30 MG/ML
30 INJECTION, SOLUTION INTRAMUSCULAR; INTRAVENOUS EVERY 6 HOURS
Status: COMPLETED | OUTPATIENT
Start: 2022-11-11 | End: 2022-11-12

## 2022-11-11 RX ORDER — BUPIVACAINE HYDROCHLORIDE 7.5 MG/ML
INJECTION, SOLUTION INTRASPINAL
Status: COMPLETED | OUTPATIENT
Start: 2022-11-11 | End: 2022-11-11

## 2022-11-11 RX ORDER — ACETAMINOPHEN 500 MG
1000 TABLET ORAL EVERY 6 HOURS PRN
Status: DISCONTINUED | OUTPATIENT
Start: 2022-11-15 | End: 2022-11-14 | Stop reason: HOSPADM

## 2022-11-11 RX ORDER — HYDRALAZINE HYDROCHLORIDE 20 MG/ML
5 INJECTION INTRAMUSCULAR; INTRAVENOUS
Status: DISCONTINUED | OUTPATIENT
Start: 2022-11-11 | End: 2022-11-11 | Stop reason: HOSPADM

## 2022-11-11 RX ORDER — ONDANSETRON 2 MG/ML
4 INJECTION INTRAMUSCULAR; INTRAVENOUS EVERY 6 HOURS PRN
Status: ACTIVE | OUTPATIENT
Start: 2022-11-11 | End: 2022-11-12

## 2022-11-11 RX ORDER — ACETAMINOPHEN 500 MG
1000 TABLET ORAL EVERY 6 HOURS
Status: DISCONTINUED | OUTPATIENT
Start: 2022-11-12 | End: 2022-11-14 | Stop reason: HOSPADM

## 2022-11-11 RX ORDER — SODIUM CHLORIDE, SODIUM GLUCONATE, SODIUM ACETATE, POTASSIUM CHLORIDE AND MAGNESIUM CHLORIDE 526; 502; 368; 37; 30 MG/100ML; MG/100ML; MG/100ML; MG/100ML; MG/100ML
INJECTION, SOLUTION INTRAVENOUS
Status: DISCONTINUED | OUTPATIENT
Start: 2022-11-11 | End: 2022-11-11 | Stop reason: SURG

## 2022-11-11 RX ORDER — OXYCODONE HYDROCHLORIDE 10 MG/1
10 TABLET ORAL EVERY 4 HOURS PRN
Status: DISCONTINUED | OUTPATIENT
Start: 2022-11-12 | End: 2022-11-14 | Stop reason: HOSPADM

## 2022-11-11 RX ORDER — ONDANSETRON 4 MG/1
4 TABLET, ORALLY DISINTEGRATING ORAL EVERY 6 HOURS PRN
Status: DISCONTINUED | OUTPATIENT
Start: 2022-11-12 | End: 2022-11-14 | Stop reason: HOSPADM

## 2022-11-11 RX ORDER — ACETAMINOPHEN 500 MG
1000 TABLET ORAL EVERY 6 HOURS
Status: DISPENSED | OUTPATIENT
Start: 2022-11-11 | End: 2022-11-12

## 2022-11-11 RX ORDER — HYDROMORPHONE HYDROCHLORIDE 1 MG/ML
0.4 INJECTION, SOLUTION INTRAMUSCULAR; INTRAVENOUS; SUBCUTANEOUS
Status: ACTIVE | OUTPATIENT
Start: 2022-11-11 | End: 2022-11-12

## 2022-11-11 RX ORDER — OXYCODONE HYDROCHLORIDE 5 MG/1
5 TABLET ORAL EVERY 4 HOURS PRN
Status: DISCONTINUED | OUTPATIENT
Start: 2022-11-12 | End: 2022-11-14 | Stop reason: HOSPADM

## 2022-11-11 RX ORDER — METOCLOPRAMIDE HYDROCHLORIDE 5 MG/ML
10 INJECTION INTRAMUSCULAR; INTRAVENOUS ONCE
Status: COMPLETED | OUTPATIENT
Start: 2022-11-11 | End: 2022-11-11

## 2022-11-11 RX ORDER — HYDROMORPHONE HYDROCHLORIDE 1 MG/ML
0.4 INJECTION, SOLUTION INTRAMUSCULAR; INTRAVENOUS; SUBCUTANEOUS
Status: DISCONTINUED | OUTPATIENT
Start: 2022-11-11 | End: 2022-11-11 | Stop reason: HOSPADM

## 2022-11-11 RX ORDER — LABETALOL HYDROCHLORIDE 5 MG/ML
5 INJECTION, SOLUTION INTRAVENOUS
Status: DISCONTINUED | OUTPATIENT
Start: 2022-11-11 | End: 2022-11-11 | Stop reason: HOSPADM

## 2022-11-11 RX ORDER — DIPHENHYDRAMINE HYDROCHLORIDE 50 MG/ML
12.5 INJECTION INTRAMUSCULAR; INTRAVENOUS EVERY 6 HOURS PRN
Status: ACTIVE | OUTPATIENT
Start: 2022-11-11 | End: 2022-11-12

## 2022-11-11 RX ORDER — VITAMIN A ACETATE, BETA CAROTENE, ASCORBIC ACID, CHOLECALCIFEROL, .ALPHA.-TOCOPHEROL ACETATE, DL-, THIAMINE MONONITRATE, RIBOFLAVIN, NIACINAMIDE, PYRIDOXINE HYDROCHLORIDE, FOLIC ACID, CYANOCOBALAMIN, CALCIUM CARBONATE, FERROUS FUMARATE, ZINC OXIDE, CUPRIC OXIDE 3080; 12; 120; 400; 1; 1.84; 3; 20; 22; 920; 25; 200; 27; 10; 2 [IU]/1; UG/1; MG/1; [IU]/1; MG/1; MG/1; MG/1; MG/1; MG/1; [IU]/1; MG/1; MG/1; MG/1; MG/1; MG/1
1 TABLET, FILM COATED ORAL
Status: DISCONTINUED | OUTPATIENT
Start: 2022-11-12 | End: 2022-11-14 | Stop reason: HOSPADM

## 2022-11-11 RX ORDER — IBUPROFEN 800 MG/1
800 TABLET ORAL EVERY 8 HOURS PRN
Status: DISCONTINUED | OUTPATIENT
Start: 2022-11-15 | End: 2022-11-14 | Stop reason: HOSPADM

## 2022-11-11 RX ORDER — OXYCODONE HCL 5 MG/5 ML
10 SOLUTION, ORAL ORAL
Status: DISCONTINUED | OUTPATIENT
Start: 2022-11-11 | End: 2022-11-11 | Stop reason: HOSPADM

## 2022-11-11 RX ORDER — CITRIC ACID/SODIUM CITRATE 334-500MG
30 SOLUTION, ORAL ORAL ONCE
Status: COMPLETED | OUTPATIENT
Start: 2022-11-11 | End: 2022-11-11

## 2022-11-11 RX ORDER — SODIUM CHLORIDE, SODIUM LACTATE, POTASSIUM CHLORIDE, CALCIUM CHLORIDE 600; 310; 30; 20 MG/100ML; MG/100ML; MG/100ML; MG/100ML
INJECTION, SOLUTION INTRAVENOUS ONCE
Status: DISPENSED | OUTPATIENT
Start: 2022-11-11 | End: 2022-11-12

## 2022-11-11 RX ORDER — OXYCODONE HYDROCHLORIDE 10 MG/1
10 TABLET ORAL EVERY 4 HOURS PRN
Status: ACTIVE | OUTPATIENT
Start: 2022-11-11 | End: 2022-11-12

## 2022-11-11 RX ORDER — DOCUSATE SODIUM 100 MG/1
100 CAPSULE, LIQUID FILLED ORAL 2 TIMES DAILY PRN
Status: DISCONTINUED | OUTPATIENT
Start: 2022-11-11 | End: 2022-11-14 | Stop reason: HOSPADM

## 2022-11-11 RX ORDER — SIMETHICONE 125 MG
125 TABLET,CHEWABLE ORAL 4 TIMES DAILY PRN
Status: DISCONTINUED | OUTPATIENT
Start: 2022-11-11 | End: 2022-11-14 | Stop reason: HOSPADM

## 2022-11-11 RX ORDER — KETOROLAC TROMETHAMINE 30 MG/ML
INJECTION, SOLUTION INTRAMUSCULAR; INTRAVENOUS PRN
Status: DISCONTINUED | OUTPATIENT
Start: 2022-11-11 | End: 2022-11-11 | Stop reason: SURG

## 2022-11-11 RX ORDER — HYDROMORPHONE HYDROCHLORIDE 1 MG/ML
0.2 INJECTION, SOLUTION INTRAMUSCULAR; INTRAVENOUS; SUBCUTANEOUS
Status: ACTIVE | OUTPATIENT
Start: 2022-11-11 | End: 2022-11-12

## 2022-11-11 RX ORDER — SODIUM CHLORIDE, SODIUM GLUCONATE, SODIUM ACETATE, POTASSIUM CHLORIDE AND MAGNESIUM CHLORIDE 526; 502; 368; 37; 30 MG/100ML; MG/100ML; MG/100ML; MG/100ML; MG/100ML
1500 INJECTION, SOLUTION INTRAVENOUS ONCE
Status: COMPLETED | OUTPATIENT
Start: 2022-11-11 | End: 2022-11-11

## 2022-11-11 RX ORDER — DIPHENHYDRAMINE HYDROCHLORIDE 50 MG/ML
25 INJECTION INTRAMUSCULAR; INTRAVENOUS EVERY 6 HOURS PRN
Status: DISCONTINUED | OUTPATIENT
Start: 2022-11-12 | End: 2022-11-14 | Stop reason: HOSPADM

## 2022-11-11 RX ORDER — SODIUM CHLORIDE, SODIUM LACTATE, POTASSIUM CHLORIDE, CALCIUM CHLORIDE 600; 310; 30; 20 MG/100ML; MG/100ML; MG/100ML; MG/100ML
INJECTION, SOLUTION INTRAVENOUS PRN
Status: DISCONTINUED | OUTPATIENT
Start: 2022-11-11 | End: 2022-11-14 | Stop reason: HOSPADM

## 2022-11-11 RX ORDER — ONDANSETRON 2 MG/ML
INJECTION INTRAMUSCULAR; INTRAVENOUS PRN
Status: DISCONTINUED | OUTPATIENT
Start: 2022-11-11 | End: 2022-11-11 | Stop reason: SURG

## 2022-11-11 RX ORDER — DIPHENHYDRAMINE HYDROCHLORIDE 50 MG/ML
12.5 INJECTION INTRAMUSCULAR; INTRAVENOUS
Status: DISCONTINUED | OUTPATIENT
Start: 2022-11-11 | End: 2022-11-11 | Stop reason: HOSPADM

## 2022-11-11 RX ORDER — MORPHINE SULFATE 0.5 MG/ML
INJECTION, SOLUTION EPIDURAL; INTRATHECAL; INTRAVENOUS
Status: COMPLETED | OUTPATIENT
Start: 2022-11-11 | End: 2022-11-11

## 2022-11-11 RX ORDER — OXYTOCIN 10 [USP'U]/ML
10 INJECTION, SOLUTION INTRAMUSCULAR; INTRAVENOUS
Status: DISCONTINUED | OUTPATIENT
Start: 2022-11-11 | End: 2022-11-14 | Stop reason: HOSPADM

## 2022-11-11 RX ORDER — IBUPROFEN 800 MG/1
800 TABLET ORAL EVERY 8 HOURS
Status: DISCONTINUED | OUTPATIENT
Start: 2022-11-12 | End: 2022-11-14 | Stop reason: HOSPADM

## 2022-11-11 RX ORDER — CEFAZOLIN SODIUM 1 G/3ML
2 INJECTION, POWDER, FOR SOLUTION INTRAMUSCULAR; INTRAVENOUS ONCE
Status: COMPLETED | OUTPATIENT
Start: 2022-11-11 | End: 2022-11-11

## 2022-11-11 RX ORDER — ONDANSETRON 2 MG/ML
4 INJECTION INTRAMUSCULAR; INTRAVENOUS EVERY 6 HOURS PRN
Status: DISCONTINUED | OUTPATIENT
Start: 2022-11-12 | End: 2022-11-14 | Stop reason: HOSPADM

## 2022-11-11 RX ORDER — CALCIUM CARBONATE 500 MG/1
1000 TABLET, CHEWABLE ORAL EVERY 6 HOURS PRN
Status: DISCONTINUED | OUTPATIENT
Start: 2022-11-11 | End: 2022-11-14 | Stop reason: HOSPADM

## 2022-11-11 RX ADMIN — FENTANYL CITRATE 15 MCG: 50 INJECTION, SOLUTION INTRAMUSCULAR; INTRAVENOUS at 09:40

## 2022-11-11 RX ADMIN — SODIUM CHLORIDE, SODIUM GLUCONATE, SODIUM ACETATE, POTASSIUM CHLORIDE AND MAGNESIUM CHLORIDE 1500 ML: 526; 502; 368; 37; 30 INJECTION, SOLUTION INTRAVENOUS at 08:15

## 2022-11-11 RX ADMIN — OXYTOCIN 125 ML/HR: 10 INJECTION, SOLUTION INTRAMUSCULAR; INTRAVENOUS at 11:47

## 2022-11-11 RX ADMIN — ACETAMINOPHEN 1000 MG: 500 TABLET ORAL at 21:57

## 2022-11-11 RX ADMIN — ACETAMINOPHEN 1000 MG: 500 TABLET ORAL at 15:28

## 2022-11-11 RX ADMIN — OXYTOCIN 1000 ML: 10 INJECTION, SOLUTION INTRAMUSCULAR; INTRAVENOUS at 10:09

## 2022-11-11 RX ADMIN — SODIUM CITRATE AND CITRIC ACID MONOHYDRATE 30 ML: 500; 334 SOLUTION ORAL at 09:00

## 2022-11-11 RX ADMIN — ONDANSETRON 4 MG: 2 INJECTION INTRAMUSCULAR; INTRAVENOUS at 09:43

## 2022-11-11 RX ADMIN — KETOROLAC TROMETHAMINE 30 MG: 30 INJECTION, SOLUTION INTRAMUSCULAR at 10:09

## 2022-11-11 RX ADMIN — KETOROLAC TROMETHAMINE 30 MG: 30 INJECTION, SOLUTION INTRAMUSCULAR at 15:27

## 2022-11-11 RX ADMIN — CEFAZOLIN 2 G: 330 INJECTION, POWDER, FOR SOLUTION INTRAMUSCULAR; INTRAVENOUS at 09:43

## 2022-11-11 RX ADMIN — METOCLOPRAMIDE 10 MG: 5 INJECTION, SOLUTION INTRAMUSCULAR; INTRAVENOUS at 08:59

## 2022-11-11 RX ADMIN — BUPIVACAINE HYDROCHLORIDE IN DEXTROSE 1.5 ML: 7.5 INJECTION, SOLUTION SUBARACHNOID at 09:40

## 2022-11-11 RX ADMIN — MORPHINE SULFATE 100 MCG: 0.5 INJECTION, SOLUTION EPIDURAL; INTRATHECAL; INTRAVENOUS at 09:40

## 2022-11-11 RX ADMIN — SODIUM CHLORIDE, SODIUM GLUCONATE, SODIUM ACETATE, POTASSIUM CHLORIDE AND MAGNESIUM CHLORIDE: 526; 502; 368; 37; 30 INJECTION, SOLUTION INTRAVENOUS at 09:35

## 2022-11-11 RX ADMIN — KETOROLAC TROMETHAMINE 30 MG: 30 INJECTION, SOLUTION INTRAMUSCULAR at 21:57

## 2022-11-11 RX ADMIN — FAMOTIDINE 20 MG: 10 INJECTION, SOLUTION INTRAVENOUS at 08:59

## 2022-11-11 RX ADMIN — PHENYLEPHRINE HYDROCHLORIDE 50 MCG/MIN: 10 INJECTION INTRAVENOUS at 09:37

## 2022-11-11 ASSESSMENT — EDINBURGH POSTNATAL DEPRESSION SCALE (EPDS)
I HAVE BEEN SO UNHAPPY THAT I HAVE BEEN CRYING: ONLY OCCASIONALLY
I HAVE BEEN ANXIOUS OR WORRIED FOR NO GOOD REASON: YES, VERY OFTEN
I HAVE BEEN SO UNHAPPY THAT I HAVE HAD DIFFICULTY SLEEPING: NOT VERY OFTEN
THE THOUGHT OF HARMING MYSELF HAS OCCURRED TO ME: NEVER
I HAVE BLAMED MYSELF UNNECESSARILY WHEN THINGS WENT WRONG: NO, NEVER
I HAVE BEEN ABLE TO LAUGH AND SEE THE FUNNY SIDE OF THINGS: NOT QUITE SO MUCH NOW
THINGS HAVE BEEN GETTING ON TOP OF ME: YES, SOMETIMES I HAVEN'T BEEN COPING AS WELL AS USUAL
I HAVE FELT SCARED OR PANICKY FOR NO GOOD REASON: YES, SOMETIMES
I HAVE LOOKED FORWARD WITH ENJOYMENT TO THINGS: AS MUCH AS I EVER DID
I HAVE FELT SAD OR MISERABLE: NO, NOT AT ALL

## 2022-11-11 ASSESSMENT — COPD QUESTIONNAIRES
DO YOU EVER COUGH UP ANY MUCUS OR PHLEGM?: NO/ONLY WITH OCCASIONAL COLDS OR INFECTIONS
IN THE PAST 12 MONTHS DO YOU DO LESS THAN YOU USED TO BECAUSE OF YOUR BREATHING PROBLEMS: DISAGREE/UNSURE
COPD SCREENING SCORE: 0
HAVE YOU SMOKED AT LEAST 100 CIGARETTES IN YOUR ENTIRE LIFE: NO/DON'T KNOW
DURING THE PAST 4 WEEKS HOW MUCH DID YOU FEEL SHORT OF BREATH: NONE/LITTLE OF THE TIME

## 2022-11-11 ASSESSMENT — LIFESTYLE VARIABLES
HOW MANY TIMES IN THE PAST YEAR HAVE YOU HAD 5 OR MORE DRINKS IN A DAY: 0
HAVE YOU EVER FELT YOU SHOULD CUT DOWN ON YOUR DRINKING: NO
ON A TYPICAL DAY WHEN YOU DRINK ALCOHOL HOW MANY DRINKS DO YOU HAVE: 0
AVERAGE NUMBER OF DAYS PER WEEK YOU HAVE A DRINK CONTAINING ALCOHOL: 0
TOTAL SCORE: 0
EVER_SMOKED: NEVER
TOTAL SCORE: 0
ALCOHOL_USE: NO
HAVE PEOPLE ANNOYED YOU BY CRITICIZING YOUR DRINKING: NO
TOTAL SCORE: 0
EVER FELT BAD OR GUILTY ABOUT YOUR DRINKING: NO
DOES PATIENT WANT TO STOP DRINKING: NO
CONSUMPTION TOTAL: NEGATIVE
EVER HAD A DRINK FIRST THING IN THE MORNING TO STEADY YOUR NERVES TO GET RID OF A HANGOVER: NO

## 2022-11-11 ASSESSMENT — PATIENT HEALTH QUESTIONNAIRE - PHQ9
2. FEELING DOWN, DEPRESSED, IRRITABLE, OR HOPELESS: NOT AT ALL
SUM OF ALL RESPONSES TO PHQ9 QUESTIONS 1 AND 2: 0
1. LITTLE INTEREST OR PLEASURE IN DOING THINGS: NOT AT ALL

## 2022-11-11 ASSESSMENT — PAIN DESCRIPTION - PAIN TYPE: TYPE: SURGICAL PAIN

## 2022-11-11 ASSESSMENT — PAIN SCALES - GENERAL: PAIN_LEVEL: 0

## 2022-11-11 NOTE — PROGRESS NOTES
0815: Report received from DIVYA Steen. Assumed care of pt. Pt presenting today for a schedule primary  d/t fetal macrosomia.    0931: Pt ambulating from LDA5 to OR2 with FOB by side.    1008: Delivery of viable female infant by Dr. Law. Apgars 8/9    1042: Pt transported from OR2 to PACU 1 in stable condition. Report received from anesthesia.    1155: Pt transported to postpartum in stable condition, firm fundus and scant lochia. Pt holding infant and FOB by side. Infant bands checked x2. Report given to DIVYA Steen.

## 2022-11-11 NOTE — OR SURGEON
Immediate Post OP Note    PreOp Diagnosis: IUP at 39 weeks, Suspected Fetal Macrosomia, Cephalopelvic disproportion      PostOp Diagnosis: IUP at 39 weeks, Suspected Fetal Macrosomia, Cephalopelivic disproportion      Procedure(s):  PRIMARY  SECTION - Wound Class: Clean Contaminated    Surgeon(s):  ANDREA Ornelas M.D.    Anesthesiologist/Type of Anesthesia:  Anesthesiologist: Reginald Farley M.D./Spinal    Surgical Staff:  Circulator: Mckayla Fowler R.N.  Scrub Person: Angela Laird C.N.A.; Marilu Turcios  L&SANTIAGO Baby  Nurse: Leana Johnson R.N.    Specimens removed if any:  * No specimens in log *    Estimated Blood Loss: 500 cc    Findings: Female, vertex, Apgars 8/9, weight 8lbs 5oz , intact placenta, 3vc. Bilateral normal tubes/ovaries. Clear urine in tubing following surgery.     Complications: none        2022 10:50 AM Cari Law M.D.

## 2022-11-11 NOTE — OP REPORT
DATE OF SERVICE:  2022     PREOPERATIVE DIAGNOSES:  1.  Intrauterine pregnancy at 39 weeks gestation.  2.  Suspected fetal macrosomia.  3.  Cephalopelvic disproportion.     POSTOPERATIVE DIAGNOSES:  1.  Intrauterine pregnancy at 39 weeks gestation.  2.  Suspected fetal macrosomia.  3.  Cephalopelvic disproportion.     PROCEDURE:  Primary low transverse  section via Pfannenstiel skin   incision with no extensions.     SURGEON:  Cari Law MD     ASSISTANT:  Jeffery Garcia MD     ANESTHESIOLOGIST:  Reginald Farley MD     ANESTHESIA:  Spinal.     COMPLICATIONS:  None.     ESTIMATED BLOOD LOSS:  500 mL.     FINDINGS:  Female infant, cephalic presentation, clear amniotic fluid, copious   amounts. Apgars 8 and 9, weight 8 pounds 5 ounces. Intact placenta, 3-vessel   cord, bilateral normal fallopian tubes and ovaries visualized. Clear urine in   the Alberts tubing following surgery.     DESCRIPTION OF PROCEDURE:  The patient was taken to the operating room where   Dr. Farley placed her under spinal anesthesia without any difficulty.  She was   then prepped and draped in normal sterile fashion in dorsal supine position   with leftward tilt.  Timeout was called to identify correct patient, correct   procedure and confirm she got preoperative antibiotics.  A Pfannenstiel skin   incision was made with a scalpel and carried down to the underlying layer of   fascia with a knife.  The fascia was incised in the midline.  The incision was   carried out laterally with Fabian scissors.  The upper edge of the fascia was   grasped with Kocher clamps, tented up, and the underlying rectus muscle   dissected off bluntly.  Lower edge of the fascia was grasped with Kocher   clamps, tented up, and the underlying rectus muscle dissected off bluntly.    The rectus muscles were  in the midline.  The peritoneum was   identified and entered and the incision was extended.  The Derick O retractor   was then placed without any  difficulty.  The lower uterine segment was   visualized and a bladder flap was created.  The lower uterine segment was   incised in a low transverse fashion with a scalpel.  Clear amniotic fluid was   noted, copious amounts of it.  The fetus was delivered in cephalic   presentation without any complications or difficulty.  Female infant.  She   voided on the OR table.  Delay for cord clamp 30 seconds. The cord was then   clamped x2 and cut and the baby was handed off to waiting delivery team nurse   and respiratory therapist.  Manual extraction of an intact placenta, 3-vessel   cord.  The uterus was cleared of all clots and debris.  The hysterotomy was   closed with 0 chromic in a running locked fashion.  Good hemostasis was   achieved.  Bilateral normal fallopian tubes and ovaries were visualized.  Once   hemostasis was assured, the Derick O retractor was removed.  The peritoneum   was then closed with 3-0 Vicryl and the rectus muscles were reapproximated   with a figure-of-eight suture of 3-0 Vicryl.  The fascia was closed with 0   Vicryl from either end and tied in the middle.  The incision was irrigated.    Subcutaneous layer was closed with 3-0 Vicryl and the skin was closed with   Monocryl.  Sponge, laps and needle counts were correct x3.  Mother and baby   both doing well.  Mother will go to postpartum, baby to  nursery.        ______________________________  MD SIMI ABDI/ISABELLA    DD:  2022 11:04  DT:  2022 11:33    Job#:  [id number]

## 2022-11-11 NOTE — ANESTHESIA PREPROCEDURE EVALUATION
Case: [id number] Date/Time: 22    Procedure: PRIMARY  SECTION    Pre-op diagnosis: INTRAUTERINE PREGNANCY TERM, SUSPECTED FETAL MACROSOMICE, 39    Location: [facility] OR 01 / SURGERY LABOR AND DELIVERY    Surgeons: Cari Law M.D.          Relevant Problems   No relevant active problems       Physical Exam    Airway   Mallampati: II  TM distance: >3 FB  Neck ROM: full       Cardiovascular - normal exam  Rhythm: regular  Rate: normal  (-) murmur     Dental - normal exam           Pulmonary - normal exam  Breath sounds clear to auscultation     Abdominal   (+) obese     Neurological - normal exam                 Anesthesia Plan    ASA 2       Plan - spinal   Neuraxial block will be primary anesthetic                Postoperative Plan: Postoperative administration of opioids is intended.    Pertinent diagnostic labs and testing reviewed    Informed Consent:    Anesthetic plan and risks discussed with patient.    Use of blood products discussed with: patient whom consented to blood products.

## 2022-11-11 NOTE — H&P
HISTORY OF PRESENT ILLNESS:  The patient is a 22-year-old , EDC of   2022 which makes her  39 weeks' gestation, even.  The patient has had a   fairly uneventful prenatal course.  She had declined all screening and   testing, but she had a normal fetal survey, female infant.  She had a normal   1-hour Glucola.  Her group B strep is negative; however, she is very petite   and her baby is measuring large for gestational age.  We have been watching   the growth of this baby and despite her not being diabetic, the baby is   measuring very large. The abdominal circumference is greater than 99th   percentile and overall, the baby on yesterday's ultrasound showed 3830 grams.    We have discussed risks, benefits, alternatives and I do not believe this   baby will be successful for a vaginal delivery with her petite stature and I   recommend a primary  for her.  She also agrees as well as her    and she elected to go ahead and set up a primary .     PAST MEDICAL HISTORY:  Noncontributory.     PAST SURGICAL HISTORY:  None.     OBSTETRICAL HISTORY:  .     GYNECOLOGIC HISTORY:  Last menstrual period was 2022.  No history of   abnormal Paps or STDs including no history of herpes simplex virus.     SOCIAL HISTORY:  She is .  No alcohol, tobacco or history of drug use.     ALLERGIES:  None.     CURRENT MEDICATIONS:  Include only prenatal vitamins.     FAMILY HISTORY:  Noncontributory.     PHYSICAL EXAMINATION:    VITAL SIGNS:  Today in the office, blood pressure 118/66, weight 147 pounds.  GENERAL:  Awake, alert, oriented, no acute distress.  CARDIOVASCULAR:  Regular rate and rhythm.  CHEST:  Clear to auscultation bilaterally.  ABDOMEN:  Gravid, nontender, nondistended, normal bowel sounds.  Fundal height   41 cm.  Fetal heart rate was reassuring in the office.  EXTREMITIES:  No cyanosis, clubbing or edema.  PELVIC:  Deferred.  The last time she was checked, she was found to  be closed.    Ultrasound done 2 days ago reveals estimated fetal weight 3830 grams.    Abdominal circumference was measuring off the charts at greater than 99th   percentile.     LABORATORY DATA:  She is O positive.  Rubella is immune, RPR nonreactive,   hepatitis B surface antigen negative.  HIV is nonreactive.  One-hour Glucola   was 117 and varicella nonimmune.  Urine culture was negative and gonorrhea,   chlamydia cultures were negative as well.     ASSESSMENT AND PLAN:  The patient is a 22-year-old  at 39 weeks'   gestation, who presents for primary  section due to suspected fetal   macrosomia and cephalopelvic disproportion.  Risks, benefits and alternatives   have been thoroughly reviewed with her. Risks including bleeding, infection,   pain, injury to bowel, bladder, uterus, fallopian tubes, ovaries, major blood   vessels or nerves.  She does accept the use of blood products in event of   hemorrhage.  All questions were answered prior to her signing consent forms in   the office.  The patient understands that the baby may in fact be smaller   than ultrasound indicating however, the abdominal circumference is quite   large.  Risk for shoulder dystocia would be significant and risk for a    anyway would also be significant.  I do not feel that this baby will   have a successful vaginal delivery, even if she tried.  She will be n.p.o.   after midnight and we will plan for a primary  section tomorrow.        ______________________________  MD SIMI ABDI/KIMBER    DD:  11/10/2022 17:55  DT:  11/10/2022 18:21    Job#:  [id number]

## 2022-11-11 NOTE — LACTATION NOTE
This note was copied from a baby's chart.  22yr, , 39wk0d, first time breastfeeding mom, P C/S    Mom called out and requested formula.  RN asked for LC assistance .  When entering room and asking how wanting to feed baby, mom states that she wants to breastfeed.  Inquired on why she asked for formula and Odette responded that she thinks that her baby is hungry.      Explained to mom and education given on the importance of allowing baby to learn to breastfeed.  Risks to breastfeeding explained and recommended not giving formula to baby unless personal choice to feed baby formula or if medically necessary.  Explained to mom that baby does not need anything more than her colostrum right now and that its best for breastfeeding success to keep baby skin to skin and allow her to breastfeed whenever she is showing hunger cues or interest instead of giving formula.  Offered to assist.    Assisted mom with football hold and positioning.  Baby rooting and cueing.  Mom taught how to hand express colostrum and large amounts of colostrum easily expressed.  Mom reassured.  Taught mom to bring baby's chin first to breast and to hold baby's body in close with head in sniffing position. Taught mom how to place nipple up towards baby's lip and place nipple on top of tongue when baby opens mouth wide.  Baby immediately latched with excellent latch and sucking observed.    Basic breastfeeding education provided.  Family in room and very supportive.  Encouraged to call for ongoing lactation support when needed.

## 2022-11-11 NOTE — ANESTHESIA POSTPROCEDURE EVALUATION
Patient: Odette Harris    Procedure Summary     Date: 22 Room / Location: [facility] / SURGERY LABOR AND DELIVERY    Anesthesia Start: 935 Anesthesia Stop:     Procedure: PRIMARY  SECTION (Abdomen) Diagnosis: (same, del)    Surgeons: Cari Law M.D. Responsible Provider: Reginald Farley M.D.    Anesthesia Type: spinal ASA Status: 2          Final Anesthesia Type: spinal  Last vitals  BP   Blood Pressure: 124/74    Temp     36.8   Pulse   100   Resp 11       SpO2 98         Anesthesia Post Evaluation    Patient location during evaluation: PACU  Patient participation: complete - patient participated  Level of consciousness: awake and alert  Pain score: 0    Airway patency: patent  Anesthetic complications: no  Cardiovascular status: hemodynamically stable  Respiratory status: acceptable  Hydration status: euvolemic    PONV: none    patient able to participate, but full recovery from regional anesthesia has not occurred and is not expected within the stipulated timeframe for the completion of the evaluation      No notable events documented.

## 2022-11-11 NOTE — ANESTHESIA PROCEDURE NOTES
Spinal Block    Date/Time: 11/11/2022 9:40 AM  Performed by: Reginald Farley M.D.  Authorized by: Reginald Farley M.D.     Start Time:  11/11/2022 9:40 AM  End Time:  11/11/2022 9:40 AM  Reason for Block: primary anesthetic    patient identified, IV checked, site marked, risks and benefits discussed, surgical consent, monitors and equipment checked, pre-op evaluation and timeout performed    Patient Position:  Sitting  Prep: ChloraPrep, patient draped and sterile technique    Monitoring:  Blood pressure, continuous pulse oximetry and heart rate  Approach:  Midline  Location:  L4-5  Injection Technique:  Single-shot  Skin infiltration:  Lidocaine  Strength:  1%  Dose:  3ml  Needle Type:  Pencan  Needle Gauge:  25 G  CSF flowing pre/post injection:  Yes  Sensory Level:  T6

## 2022-11-11 NOTE — CARE PLAN
The patient is Stable - Low risk of patient condition declining or worsening    Shift Goals  Clinical Goals: VSS, Monitor I&O's, education  Patient Goals: Rest  Family Goals: SAMMY    Progress made toward(s) clinical / shift goals:        Problem: Knowledge Deficit - L&D  Goal: Patient and family/caregivers will demonstrate understanding of plan of care, disease process/condition, diagnostic tests and medications  Outcome: Progressing     Problem: Risk for Excess Fluid Volume  Goal: Patient will demonstrate pulse, blood pressure and neurologic signs within expected ranges and without any respiratory complications  Outcome: Progressing     Problem: Knowledge Deficit -   Goal: Patient/support person will demonstrate understanding regarding  section  Outcome: Progressing     Problem: Psychosocial - L&D  Goal: Patient's level of anxiety will decrease  Outcome: Progressing  Goal: Patient will be able to discuss coping skills during hospitalization  Outcome: Progressing  Goal: Patient's ability to re-evaluate and adapt role responsibilities will improve  Outcome: Progressing  Goal: Spiritual and cultural needs incorporated into hospitalization  Outcome: Progressing     Problem: Pain  Goal: Patient's pain will be alleviated or reduced to the patient’s comfort goal  Outcome: Progressing     Problem: Risk for Infection and Impaired Wound Healing  Goal: Patient will remain free from infection  Outcome: Progressing  Goal: Patient's wound/surgical incision will decrease in size and heals properly  Outcome: Progressing     Problem: Risk for Fluid Imbalance  Goal: Patient's fluid volume balance will be maintained or improve  Outcome: Progressing     Problem: Risk for Venous Thromboembolism (VTE)  Goal: VTE prevention measures will be implemented and patient will remain free from VTE  Outcome: Progressing     Problem: Respiratory  Goal: Patient will achieve/maintain optimum respiratory ventilation and gas  exchange  Outcome: Progressing     Problem: Discharge Barriers/Planning  Goal: Patient's continuum of care needs are met  Outcome: Progressing     Problem: Knowledge Deficit - Standard  Goal: Patient and family/care givers will demonstrate understanding of plan of care, disease process/condition, diagnostic tests and medications  Outcome: Progressing     Problem: Pain - Standard  Goal: Alleviation of pain or a reduction in pain to the patient’s comfort goal  Outcome: Progressing       Patient is not progressing towards the following goals:

## 2022-11-11 NOTE — CARE PLAN
The patient is Stable - Low risk of patient condition declining or worsening      Problem: Pain  Goal: Patient's pain will be alleviated or reduced to the patient’s comfort goal  Outcome: Progressing  Note: Pt educated  on pain management options and to notify RN when pain control needed     Problem: Risk for Venous Thromboembolism (VTE)  Goal: VTE prevention measures will be implemented and patient will remain free from VTE  Outcome: Progressing  Note: PT educated on VTE prevention post surgery; SCDs on place

## 2022-11-12 PROCEDURE — 700102 HCHG RX REV CODE 250 W/ 637 OVERRIDE(OP): Performed by: ANESTHESIOLOGY

## 2022-11-12 PROCEDURE — 700111 HCHG RX REV CODE 636 W/ 250 OVERRIDE (IP): Performed by: ANESTHESIOLOGY

## 2022-11-12 PROCEDURE — 770002 HCHG ROOM/CARE - OB PRIVATE (112)

## 2022-11-12 PROCEDURE — A9270 NON-COVERED ITEM OR SERVICE: HCPCS | Performed by: OBSTETRICS & GYNECOLOGY

## 2022-11-12 PROCEDURE — A9270 NON-COVERED ITEM OR SERVICE: HCPCS | Performed by: ANESTHESIOLOGY

## 2022-11-12 PROCEDURE — 700102 HCHG RX REV CODE 250 W/ 637 OVERRIDE(OP): Performed by: OBSTETRICS & GYNECOLOGY

## 2022-11-12 RX ADMIN — ACETAMINOPHEN 1000 MG: 500 TABLET ORAL at 03:44

## 2022-11-12 RX ADMIN — ACETAMINOPHEN 1000 MG: 500 TABLET ORAL at 10:12

## 2022-11-12 RX ADMIN — KETOROLAC TROMETHAMINE 30 MG: 30 INJECTION, SOLUTION INTRAMUSCULAR at 03:44

## 2022-11-12 RX ADMIN — KETOROLAC TROMETHAMINE 30 MG: 30 INJECTION, SOLUTION INTRAMUSCULAR at 10:12

## 2022-11-12 RX ADMIN — PRENATAL WITH FERROUS FUM AND FOLIC ACID 1 TABLET: 3080; 920; 120; 400; 22; 1.84; 3; 20; 10; 1; 12; 200; 27; 25; 2 TABLET ORAL at 12:00

## 2022-11-12 RX ADMIN — DOCUSATE SODIUM 100 MG: 100 CAPSULE, LIQUID FILLED ORAL at 10:13

## 2022-11-12 RX ADMIN — ACETAMINOPHEN 1000 MG: 500 TABLET ORAL at 17:15

## 2022-11-12 RX ADMIN — OXYCODONE 5 MG: 5 TABLET ORAL at 20:22

## 2022-11-12 RX ADMIN — IBUPROFEN 800 MG: 800 TABLET, FILM COATED ORAL at 17:15

## 2022-11-12 RX ADMIN — OXYCODONE 5 MG: 5 TABLET ORAL at 12:01

## 2022-11-12 ASSESSMENT — PAIN DESCRIPTION - PAIN TYPE
TYPE: SURGICAL PAIN

## 2022-11-12 NOTE — DISCHARGE PLANNING
Discharge Planning Assessment Post Partum:    Reason for Referral: SW Order in Epic, EPDS score of 10    Address: [address] [place] [address]   -Demographic Information Verified? Yes   -Residence: Apartment    Mom Diagnosis:   Baby Diagnosis: full term    Primary Language: English    Learning Barriers Present: No      Marital Status:     Name of Infant: Joanne Paniagua     Father of the Baby: Hamilton Paniagua    -Involved in baby's care? Yes, at bedside involved and supportive    Prenatal Care: Yes    PCP: Dr. Law   -PCP Verified: No    PCP for new baby: Unsure, awaiting call back from [facility]     Support System: Family, partner and mom    Coping/Bonding between mother & infant: Yes    Source of Feeding: Breastfeeding    Supplies for Infant: Car Seat, Clothes, and Crib    Insurance: Kumar   -Baby Covered on Insurance: Yes    -If No - PFA has applied for Medicaid: NA    Employed/School: Unemployed    Other children in the home/names & ages: none    Financial Hardship:  No   -If yes, household income: n/a    Mental Status: Alert and Oriented    Services used prior to admit: none    CPS History: No    Psychiatric History: No    Domestic Violence History: No    Drug/ETOH History: No    Resources Provided: PPD info and resources    Referrals Made:  none     Clearance for Discharge: Yes    Anticipated Discharge Plan: Baby clear to discharge home with MOB

## 2022-11-12 NOTE — PROGRESS NOTES
Progress Note    Odette Harris   Post-op day 1  Chief Admitting Dx: IUP (intrauterine pregnancy), incidental [Z33.1]  Delivery Type:  for macrosomia      Subjective:  Ambulating: voiding, tolerating diet, normal lochia, pain controlled w/ po medication but was a little worse this am 6/10 but was just medicated. Breastfeeding is difficult - hasn't gotten baby to latch yet    Vitals:    22 2300 22 0000 22 0100 22 0200   BP:    118/76   Pulse: 82 93 85 89   Resp: 18 17 18 18   Temp:    36.8 °C (98.2 °F)   TempSrc:    Temporal   SpO2: 98% 95% 96% 95%   Weight:       Height:           Exam:  Gen: no acute distress  Abdomen: soft nt/nd firm fundus  Inc: c/d/I with postop dressing  Ext: no calf pain sathya LE    Labs:   Recent Results (from the past 24 hour(s))   Hold Blood Bank Specimen (Not Tested)    Collection Time: 22  8:15 AM   Result Value Ref Range    Holding Tube - Bb DONE    CBC with Differential    Collection Time: 22  8:15 AM   Result Value Ref Range    WBC 10.3 4.8 - 10.8 K/uL    RBC 4.51 4.20 - 5.40 M/uL    Hemoglobin 13.0 12.0 - 16.0 g/dL    Hematocrit 38.9 37.0 - 47.0 %    MCV 86.3 81.4 - 97.8 fL    MCH 28.8 27.0 - 33.0 pg    MCHC 33.4 (L) 33.6 - 35.0 g/dL    RDW 42.7 35.9 - 50.0 fL    Platelet Count 255 164 - 446 K/uL    MPV 9.9 9.0 - 12.9 fL    Neutrophils-Polys 72.00 44.00 - 72.00 %    Lymphocytes 20.80 (L) 22.00 - 41.00 %    Monocytes 5.90 0.00 - 13.40 %    Eosinophils 0.50 0.00 - 6.90 %    Basophils 0.30 0.00 - 1.80 %    Immature Granulocytes 0.50 0.00 - 0.90 %    Nucleated RBC 0.00 /100 WBC    Neutrophils (Absolute) 7.39 (H) 2.00 - 7.15 K/uL    Lymphs (Absolute) 2.13 1.00 - 4.80 K/uL    Monos (Absolute) 0.61 0.00 - 0.85 K/uL    Eos (Absolute) 0.05 0.00 - 0.51 K/uL    Baso (Absolute) 0.03 0.00 - 0.12 K/uL    Immature Granulocytes (abs) 0.05 0.00 - 0.11 K/uL    NRBC (Absolute) 0.00 K/uL   T.PALLIDUM AB LAYO (Syphilis)    Collection Time:  11/11/22  8:47 AM   Result Value Ref Range    Syphilis, Treponemal Qual Non-Reactive Non-Reactive   CBC without differential- Once in 8 hours post delivery    Collection Time: 11/11/22 10:03 PM   Result Value Ref Range    WBC 11.5 (H) 4.8 - 10.8 K/uL    RBC 3.47 (L) 4.20 - 5.40 M/uL    Hemoglobin 10.3 (L) 12.0 - 16.0 g/dL    Hematocrit 29.8 (L) 37.0 - 47.0 %    MCV 85.9 81.4 - 97.8 fL    MCH 29.7 27.0 - 33.0 pg    MCHC 34.6 33.6 - 35.0 g/dL    RDW 42.3 35.9 - 50.0 fL    Platelet Count 220 164 - 446 K/uL    MPV 9.8 9.0 - 12.9 fL       Assessment:  POD 1    Plan; continue routine postop  Work with lactation on breastfeeding  May shower today.     Cari Law M.D.

## 2022-11-12 NOTE — CARE PLAN
The patient is Stable - Low risk of patient condition declining or worsening    Shift Goals  Clinical Goals: vss  Patient Goals: Rest  Family Goals: SAMMY    Problem: Pain - Standard  Goal: Alleviation of pain or a reduction in pain to the patient’s comfort goal  Outcome: Progressing  Note: MOB coping well with pain, understands plan of care for pain management     Problem: Knowledge Deficit - Standard  Goal: Patient and family/care givers will demonstrate understanding of plan of care, disease process/condition, diagnostic tests and medications  Outcome: Progressing  Note: MOB demonstrates and verbalizes understanding on how to care for . Asks appropriate questions and calls for help when necessary. Education has been provided to patient.

## 2022-11-12 NOTE — LACTATION NOTE
This note was copied from a baby's chart.  RN called for breastfeeding assistance.  Baby crying in open crib and mom using breast pump.  Breast pump initiated this morning because mom unable to latch baby.  Recommended turning off pump and trying to get baby to latch and offered to assist.  Worked with positioning baby and holding and shaping nipple and breast to best achieve a deep latch.  After just a couple of attempts and in football hold, baby latched and began breastfeeding.  Education provided to mom that it is best practice to try to get baby to latch at breast first and may use breast pump only if unable to get baby to latch.  Advised to call for assistance today and that it takes time and practice to learn and feel comfortable with breastfeeding.  If wanting to breastfeed, then important to allow baby to breastfeed whenever she is showing interest or cues.  Instructed mom that if unable to get baby to latch at breast, supplementation is necessary as well as pumping but  does not recommend this feeding path at his time.    Excellent latch and sucking observed.  5ml of pumped colostrum also fed back to baby via finger feeding and taught mom how to clean pump parts.  Mom has [employer] personal pump at home.  Nbaby stayed latched at breast and sucking for approx 15minutes.  Mom encouraged to keep baby skin to skin and burp her if she is finished.

## 2022-11-12 NOTE — PROGRESS NOTES
1945 - Assessment completed. Fundus firm, lochia light. Adominal incision with dressing intact clean and dry. Plan of care reviewed with MOB, verbalized understanding. Denies pain at this time, will call if pain medication needed.

## 2022-11-13 PROCEDURE — A9270 NON-COVERED ITEM OR SERVICE: HCPCS | Performed by: OBSTETRICS & GYNECOLOGY

## 2022-11-13 PROCEDURE — 770002 HCHG ROOM/CARE - OB PRIVATE (112)

## 2022-11-13 PROCEDURE — 700102 HCHG RX REV CODE 250 W/ 637 OVERRIDE(OP): Performed by: OBSTETRICS & GYNECOLOGY

## 2022-11-13 RX ADMIN — OXYCODONE 5 MG: 5 TABLET ORAL at 13:05

## 2022-11-13 RX ADMIN — PRENATAL WITH FERROUS FUM AND FOLIC ACID 1 TABLET: 3080; 920; 120; 400; 22; 1.84; 3; 20; 10; 1; 12; 200; 27; 25; 2 TABLET ORAL at 08:19

## 2022-11-13 RX ADMIN — IBUPROFEN 800 MG: 800 TABLET, FILM COATED ORAL at 08:19

## 2022-11-13 RX ADMIN — IBUPROFEN 800 MG: 800 TABLET, FILM COATED ORAL at 00:45

## 2022-11-13 RX ADMIN — ACETAMINOPHEN 1000 MG: 500 TABLET ORAL at 12:01

## 2022-11-13 RX ADMIN — OXYCODONE 5 MG: 5 TABLET ORAL at 05:01

## 2022-11-13 RX ADMIN — OXYCODONE 5 MG: 5 TABLET ORAL at 09:02

## 2022-11-13 RX ADMIN — ACETAMINOPHEN 1000 MG: 500 TABLET ORAL at 00:45

## 2022-11-13 RX ADMIN — ACETAMINOPHEN 1000 MG: 500 TABLET ORAL at 17:52

## 2022-11-13 RX ADMIN — DOCUSATE SODIUM 100 MG: 100 CAPSULE, LIQUID FILLED ORAL at 20:49

## 2022-11-13 RX ADMIN — IBUPROFEN 800 MG: 800 TABLET, FILM COATED ORAL at 16:22

## 2022-11-13 RX ADMIN — OXYCODONE 5 MG: 5 TABLET ORAL at 20:50

## 2022-11-13 RX ADMIN — ACETAMINOPHEN 1000 MG: 500 TABLET ORAL at 05:59

## 2022-11-13 ASSESSMENT — PAIN DESCRIPTION - PAIN TYPE
TYPE: ACUTE PAIN;SURGICAL PAIN

## 2022-11-13 NOTE — LACTATION NOTE
This note was copied from a baby's chart.  Follow up:    MOB about to breastfeed baby but stating baby wont wake up.  Recommended to unswaddle baby and gently stimulate baby and place skin to skin.  Within 2min, baby showing feeding cues.    MOB able to independenlty breastfeed baby in football position.  Denies pain or discomfort.      Reinforced basic breastfeeding information including: feeding baby with feeding cues and at least a minimum of 8-10x/24 hours.  It is not recommended to let baby sleep longer than 4 hours between feedings and if sleepy, place skin to skin to promote feeding interest and milk production. Discussed early feeding cues seen as: awake infant, rooting towards touched cheek, licking or mouth movements.  Expect cluster feeding as this is normal during early days of life and growth spurts. Discussed recognition of early feeding cues and importance of deep latch.      If baby is able to latch at least 8x in 24hrs with a sustained latch and audible swallowing,  pumping is no longer indicated since baby latch has improved. MOB verbalizes understanding.     Discussed various breastfeeding positions including cross cradle and football hold stressing the importance position  of mom and baby.  Babys tummy turned to mother assuring ear shoulder hip alignment and skin to skin with MOB.  C cup hand placement of hand on breast to ridge breast aiming nipple to nose.  Discussed asymmetric latch, lips flanged approx 120 degrees. Proper positioning  of pillows to mothers comfort and stool for foot support if needed.     Expect breast changes as breastmilk increases in volume.  Frequent feedings as well as looking for transitioning stools from dark meconium to bright yellow/green seedy loose stools by day 5.      WIC referral sent.    [facility] resources given.

## 2022-11-13 NOTE — PROGRESS NOTES
Progress Note    Odette Maude Head Steven   Post-op day 2  Chief Admitting Dx: IUP (intrauterine pregnancy), incidental [Z33.1]  Delivery Type:  for fetal distress      Subjective:  Ambulating: yes  Tolerating oral feed: yes  Flatus: yes    Voiding: yes  Dizziness: no  Vitals:    22 0600 22 0658 22 1716 22 0600   BP: (!) 96/45 115/73 124/68 118/71   Pulse: 85  94 70   Resp: 18  18 18   Temp: 36.8 °C (98.2 °F)  36.9 °C (98.5 °F) 36.7 °C (98.1 °F)   TempSrc: Temporal  Temporal Temporal   SpO2: 97%  98% 96%   Weight:       Height:           Exam:  Abdomen: Abdomen soft, non-tender. BS normal. No masses,  No organomegaly  Incision: dry and intact  Fundus Tenderness: no  Below umbilicus: yes  Perineum: perineum intact  Extremities: Normal  Lochia: mild    Labs:   No results found for this or any previous visit (from the past 24 hour(s)).    Assessment:  Continue Routine postpartum care      Plan:  Advance Diet, Encourange Ambulation, and Consider Discharge 24h-48 hours    Karine Limon M.D.

## 2022-11-13 NOTE — CARE PLAN
The patient is Stable - Low risk of patient condition declining or worsening    Shift Goals  Clinical Goals: pain control, adequate pain  Patient Goals: poin cotnrol  Family Goals: rest      Problem: Respiratory  Goal: Patient will achieve/maintain optimum respiratory ventilation and gas exchange  Outcome: Progressing  Note: Vital signs have been stable during shift. Patient displays no signs or symptoms of respiratory distress.

## 2022-11-13 NOTE — PROGRESS NOTES
2130-Assessment completed. Fundus firm, lochia light. Adominal incision with dressing intact clean and dry. Plan of care reviewed with MOB, verbalized understanding. C/O of pain see MAR.

## 2022-11-13 NOTE — CARE PLAN
The patient is Stable - Low risk of patient condition declining or worsening    Shift Goals  Clinical Goals: pain management; mobilize; breastfeeding support  Patient Goals: pain control; bonding; breastfeeding; pumping  Family Goals: education; support; bonding    Progress made toward(s) clinical / shift goals:    Problem: Knowledge Deficit - Standard  Goal: Patient and family/care givers will demonstrate understanding of plan of care, disease process/condition, diagnostic tests and medications  Outcome: Progressing     Problem: Pain - Standard  Goal: Alleviation of pain or a reduction in pain to the patient’s comfort goal  Outcome: Progressing   Discussed daily POC. Discussed pain management. Medicated per MAR for pain, tolerated well. Encouraged mobilization. Encouraged bonding and breastfeeding    Patient is not progressing towards the following goals:

## 2022-11-13 NOTE — PROGRESS NOTES
Assumed care of patient at bedside report from NOC RN. Updated on POC. Patient currently A & O x 4; on room air; up independently; with complaints of 6-7/10 abdominal incision site discomfort, medicated per MAR for pain. Assessment completed. Infant bundled and held, open crib at bedside. Support at bedside. Call light within reach. Whiteboard updated. Fall precautions in place. Bed locked and in lowest position. All questions answered. No other needs indicated at this time.

## 2022-11-14 ENCOUNTER — PHARMACY VISIT (OUTPATIENT)
Dept: PHARMACY | Facility: MEDICAL CENTER | Age: 22
End: 2022-11-14
Payer: MEDICARE

## 2022-11-14 VITALS
HEIGHT: 59 IN | BODY MASS INDEX: 29.64 KG/M2 | SYSTOLIC BLOOD PRESSURE: 104 MMHG | WEIGHT: 147 LBS | TEMPERATURE: 98 F | HEART RATE: 64 BPM | OXYGEN SATURATION: 98 % | RESPIRATION RATE: 16 BRPM | DIASTOLIC BLOOD PRESSURE: 61 MMHG

## 2022-11-14 PROCEDURE — RXMED WILLOW AMBULATORY MEDICATION CHARGE: Performed by: OBSTETRICS & GYNECOLOGY

## 2022-11-14 PROCEDURE — A9270 NON-COVERED ITEM OR SERVICE: HCPCS | Performed by: OBSTETRICS & GYNECOLOGY

## 2022-11-14 PROCEDURE — 700102 HCHG RX REV CODE 250 W/ 637 OVERRIDE(OP): Performed by: OBSTETRICS & GYNECOLOGY

## 2022-11-14 RX ORDER — OXYCODONE HYDROCHLORIDE 5 MG/1
5 TABLET ORAL EVERY 4 HOURS PRN
Qty: 24 TABLET | Refills: 0 | Status: SHIPPED | OUTPATIENT
Start: 2022-11-14 | End: 2022-11-18

## 2022-11-14 RX ORDER — IBUPROFEN 800 MG/1
800 TABLET ORAL EVERY 8 HOURS
Qty: 30 TABLET | Refills: 1 | Status: ON HOLD | OUTPATIENT
Start: 2022-11-14 | End: 2023-12-18

## 2022-11-14 RX ADMIN — OXYCODONE 5 MG: 5 TABLET ORAL at 11:01

## 2022-11-14 RX ADMIN — IBUPROFEN 800 MG: 800 TABLET, FILM COATED ORAL at 10:24

## 2022-11-14 RX ADMIN — IBUPROFEN 800 MG: 800 TABLET, FILM COATED ORAL at 00:47

## 2022-11-14 RX ADMIN — DOCUSATE SODIUM 100 MG: 100 CAPSULE, LIQUID FILLED ORAL at 10:24

## 2022-11-14 RX ADMIN — ACETAMINOPHEN 1000 MG: 500 TABLET ORAL at 12:53

## 2022-11-14 RX ADMIN — ACETAMINOPHEN 1000 MG: 500 TABLET ORAL at 00:47

## 2022-11-14 RX ADMIN — SIMETHICONE 125 MG: 125 TABLET, CHEWABLE ORAL at 10:24

## 2022-11-14 RX ADMIN — OXYCODONE 5 MG: 5 TABLET ORAL at 02:56

## 2022-11-14 RX ADMIN — ACETAMINOPHEN 1000 MG: 500 TABLET ORAL at 06:51

## 2022-11-14 RX ADMIN — PRENATAL WITH FERROUS FUM AND FOLIC ACID 1 TABLET: 3080; 920; 120; 400; 22; 1.84; 3; 20; 10; 1; 12; 200; 27; 25; 2 TABLET ORAL at 10:24

## 2022-11-14 NOTE — CARE PLAN
The patient is Stable - Low risk of patient condition declining or worsening    Shift Goals  Clinical Goals: Pain control  Patient Goals:   Family Goals:     Progress made toward(s) clinical / shift goals: Patient receiving scheduled motrin and tylenol. Will call when needing stronger medication.     Patient is not progressing towards the following goals:

## 2022-11-14 NOTE — LACTATION NOTE
This note was copied from a baby's chart.  Discharging home.  Encouraged to call [facility] office of choice for postpartum enrollment and lactation and breastfeeding assistance.  Encouraged to continue allowing baby to breastfeed with cues and at least 8-10 time every 24 hours.  Reminded of importance of a deep latch to avoid breastfeeding discomfort.

## 2022-11-14 NOTE — PROGRESS NOTES
Received report from DIVYA Keys. Patient in bed declines any needs at this time. Patient taking scheduled motrin and tylenol as scheduled. Will call If needing something stronger. Whiteboards updated, POC discussed. Call light within reach. Patient encouraged to call with any needs and or concerns.

## 2022-11-14 NOTE — DISCHARGE INSTRUCTIONS
PATIENT DISCHARGE EDUCATION INSTRUCTION SHEET    REASONS TO CALL YOUR OBSTETRICIAN  Persistent fever, shaking, chills (Temperature higher than 100.4) may indicate you have an infection  Heavy bleeding: soaking more than 1 pad per hour; Passing clots an egg-sized clot or bigger may mean you have an postpartum hemorrhage  Foul odor from vagina or bad smelling or discolored discharge or blood  Breast infection (Mastitis symptoms); breast pain, chills, fever, redness or red streaks, may feel flu like symptoms  Urinary pain, burning or frequency  Incision that is not healing, increased redness, swelling, tenderness or pain, or any pus from episiotomy or  site may mean you have an infection  Redness, swelling, warmth, or painful to touch in the calf area of your leg may mean you have a blood clot  Severe or intensified depression, thoughts or feelings of wanting to hurt yourself or someone else   Pain in chest, obstructed breathing or shortness of breath (trouble catching your breath) may mean you are having a postpartum complication. Call your provider immediately   Headache that does not get better, even after taking medicine, a bad headache with vision changes or pain in the upper right area of your belly may mean you have high blood pressure or post birth preeclampsia. Call your provider immediately    HAND WASHING  All family and friends should wash their hands:  Before and after holding the baby  Before feeding the baby  After using the restroom or changing the baby's diaper    WOUND CARE  Ask your physician for additional care instructions. In general:   Incision:  May shower and pat incision dry   Keep the incision clean and dry  There should not be any opening or pus from the incision  Continue to walk at home 3 times a day   Do NOT lift anything heavier than your baby (over 10 pounds)  Encourage family to help participate in care of the  to allow rest and mom time to heal  Continue to  "use christian-bottle until bleeding stops as needed    VAGINAL CARE AND BLEEDING  Nothing inside vagina for 6 weeks:   No sexual intercourse, tampons or douching  Bleeding may continue for 2-4 weeks. Amount and color may vary  Soaking 1 pad or more in an hour for several hours is considered heavy bleeding  Passing large egg sized blood clots can be concerning  If you feel like you have heavy bleeding or are having increasing amount of blood clots call your Obstetrician immediately  If you begin feeling faint upon standing, feeling sick to your stomach, have clammy skin, a really fast heartbeat, have chills, start feeling confused, dizzy, sleepy or weak, or feeling like you're going to faint call your Obstetrician immediately    HYPERTENSION   Preeclampsia or gestational hypertension are types of high blood pressure that only pregnant women can get. It is important for you to be aware of symptoms to seek early intervention and treatment. If you have any of these symptoms immediately call your Obstetrician    Vision changes or blurred vision   Severe headache or pain that is unrelieved with medication and will not go away  Persistent pain in upper abdomen or shoulder   Increased swelling of face, feet, or hands  Difficulty breathing or shortness of breath at rest  Urinating less than usual    URINATION AND BOWEL MOVEMENTS  Eating more fiber (bran cereal, fruits, and vegetables) and drinking plenty of fluids will help to avoid constipation  Urinary frequency and urgency after childbirth is normal  If you experience any urinary pain, burning or frequency call your provider    BIRTH CONTROL  It is possible to become pregnant at any time after delivery and while breastfeeding  Plan to discuss a method of birth control with your physician at your post delivery follow up visit    POSTPARTUM BLUES  During the first few days after birth, you may experience a sense of the \"blues\" which may include impatience, irritability or even " "crying. These feelings come and go quickly. However, as many as 1 in 10 women experience emotional symptoms known as postpartum depression.     POSTPARTUM DEPRESSION    May start as early as the second or third day after delivery or take several weeks or months to develop. Symptoms of \"blues\" are present, but are more intense: Crying spells; loss of appetite; feelings of hopelessness or loss of control; fear of touching the baby; over concern or no concern at all about the baby; little or no concern about your own appearance/caring for yourself; and/or inability to sleep or excessive sleeping. Contact your Obstetrician if you are experiencing any of these symptoms     PREVENTING SHAKEN BABY  If you are angry or stressed, PUT THE BABY IN THE CRIB, step away, take some deep breaths, and wait until you are calm to care for the baby. DO NOT SHAKE THE BABY. You are not alone, call a supporter for help.  [facility] 24/7 crisis call line (334-083-6103) or (1-208.796.4083)  You can also text them, text \"ANSWER\" (409806)  "

## 2022-11-14 NOTE — PROGRESS NOTES
@ 0700: Report received from Sanjeev STOKES. Patient is awake and alert.    @ 0830:Discussed plan of care. Assessment done. Will medicate patient after breakfast.     @ 1330: Discussed discharge instructions to patient including the baby's discharge. They have car seat for baby. Meds to beds delivered her medication for home.

## 2022-11-14 NOTE — DISCHARGE SUMMARY
DATE OF ADMISSION:  2022   DATE OF DISCHARGE:  2022     ADMITTING DIAGNOSES:  1.  Intrauterine pregnancy at 39 weeks.  2.  Suspected fetal macrosomia and cephalopelvic disproportion.  3.  Desires primary  section.     DISCHARGE DIAGNOSES:   1.  Intrauterine pregnancy at 39 weeks.  2.  Suspected fetal macrosomia and cephalopelvic disproportion.  3.  Status post primary  section.      HOSPITAL COURSE:  The patient was admitted and underwent  section,   uncomplicated.  She is postoperative day #3 and ready to go home.  She is   tolerating a regular diet, urinating, ambulating without difficulty.  Her   postoperative hemoglobin is stable at 10.3.  She has prescriptions written for   narcotic and Motrin.  I have reviewed her history and there is no risk.  She   has been instructed to follow up with Dr. Law in 2 weeks' time.        ______________________________  Corinne E. Capurro, MD [facility]    DD:  2022 08:11  DT:  2022 08:35    Job#:  [id number]

## 2022-11-15 NOTE — DISCHARGE PLANNING
Meds-to-Beds: Discharge prescription orders listed below delivered to patient's bedside. DIVYA Willard notified. Patient counseled.      Current Outpatient Medications   Medication Sig Dispense Refill    ibuprofen (MOTRIN) 800 MG Tab Take 1 Tablet by mouth every 8 hours. Indications: Joint Damage causing Pain and Loss of Function 30 Tablet 1    oxyCODONE immediate-release (ROXICODONE) 5 MG Tab Take 1 Tablet by mouth every four hours as needed for Severe Pain for up to 4 days. 24 Tablet 0      Angela Kerns, PharmD

## 2023-10-15 ENCOUNTER — HOSPITAL ENCOUNTER (EMERGENCY)
Facility: MEDICAL CENTER | Age: 23
End: 2023-10-15
Attending: OBSTETRICS & GYNECOLOGY | Admitting: OBSTETRICS & GYNECOLOGY
Payer: COMMERCIAL

## 2023-10-15 VITALS
HEART RATE: 106 BPM | TEMPERATURE: 98.4 F | WEIGHT: 130 LBS | SYSTOLIC BLOOD PRESSURE: 115 MMHG | HEIGHT: 59 IN | OXYGEN SATURATION: 96 % | DIASTOLIC BLOOD PRESSURE: 57 MMHG | BODY MASS INDEX: 26.21 KG/M2 | RESPIRATION RATE: 17 BRPM

## 2023-10-15 LAB
APPEARANCE UR: ABNORMAL
COLOR UR AUTO: YELLOW
GLUCOSE UR QL STRIP.AUTO: 100 MG/DL
KETONES UR QL STRIP.AUTO: >=160 MG/DL
LEUKOCYTE ESTERASE UR QL STRIP.AUTO: ABNORMAL
NITRITE UR QL STRIP.AUTO: NEGATIVE
PH UR STRIP.AUTO: 6 [PH] (ref 5–8)
PROT UR QL STRIP: ABNORMAL MG/DL
RBC UR QL AUTO: ABNORMAL
SP GR UR STRIP.AUTO: 1.01 (ref 1–1.03)

## 2023-10-15 PROCEDURE — 99284 EMERGENCY DEPT VISIT MOD MDM: CPT

## 2023-10-15 PROCEDURE — 81002 URINALYSIS NONAUTO W/O SCOPE: CPT

## 2023-10-15 PROCEDURE — 59025 FETAL NON-STRESS TEST: CPT

## 2023-10-15 ASSESSMENT — PAIN SCALES - GENERAL: PAINLEVEL: 0 - NO PAIN

## 2023-10-16 NOTE — ED PROVIDER NOTES
"Department of Obstetrics and Gynecology  Labor and Delivery History and Physical  OB ED Note    Date of Admission: 10/15/2023     ID: 23 y.o.  with IUP at 30w5d. RANI 2023    Primary OB: Cari Law M.D.     Attending OB: Lori Petersen M.D.     CC: spotting     HPI: Odette Harris is a 23 y.o.  at 30w5d who present for spotting. She had some light spotting yesterday and again today. She reports that it is between red and brown and mostly when she wipes. She has a general heavy and tight sensation in her lower abdomen.  She had intercourse 2 days ago.  In the last 3 days they drove to [place], went to a wedding and drove back today.  She ate breakfast this morning but then had not eaten until this evening.  She denies contractions.  She denies loss of fluid.  She reports good fetal movement.  No dysuria or hematuria.  She has some occasional mild nausea.  No changes to her vaginal discharge    ROS: 10 systems reviewed and negative except as noted above.    Obstetric History    - , 39w, primary LTCS for suspected macrosomia, female, 8lb5oz, \"Kaela\"   G2 - Current pregnancy        Past Medical History  Surgical History   None    section      Gynecologic History  Social History    Regular menses prior to pregnancy  denies Hx of abnormal pap smears.  denies Hx of STIs Tobacco: denies  EtOH: denies  Street Drugs: denies  Presents with FOB, [place]      Medications  Allergies   PN vitamin    Patient has no known allergies.       O: /57   Pulse (!) 106   Temp 36.9 °C (98.4 °F) (Temporal)   Resp 17   Ht 1.499 m (4' 11\")   Wt 59 kg (130 lb)   SpO2 96%   BMI 26.26 kg/m²     Gen: NAD, AAO  Abd: Gravid, NTTP,Cephalic by [place], No rebound or guarding  Ext: NTTP, no edema, 2+DPP  Pelvic: SVE closed/thick/high. SSE: no blood in vaginal vault    FHT:  130s with moderate LTV. +accels. No variables/decels  [place]: CTX rare    Labs:    Latest Reference Range " & Units 10/15/23 20:15   POC Color  Yellow   POC Appearance  Cloudy !   POC Specific Gravity 1.005 - 1.030  1.015   POC Urine PH 5.0 - 8.0  6.0   POC Glucose Negative mg/dL 100 !   POC Ketones Negative mg/dL >=160 !   POC Protein Negative mg/dL Trace !   POC Nitrites Negative  Negative   POC Leukocyte Esterase Negative  Trace !   POC Blood Negative  Large !   !: Data is abnormal    A/P: Odette Harris is a 23 y.o.  at 30 weeks and 5 days with spotting  Spotting: No vaginal bleeding noted on exam.  Her cervix is closed thick and high.  Only rare contractions are noted on tocometry.  She is noted to be markedly dehydrated.  She is tolerating p.o.  Encouraged rest and hydration.  She is Rh+.  Bleeding and pain precautions given  Fetal surveillance is reassuring for gestational age      Lori Petersen M.D.,  10/15/2023, 9:02 PM

## 2023-10-16 NOTE — PROGRESS NOTES
: Pt presents to triage with chief complaint of spotting- more so on Friday and some today, . She states some mild nausea,  and irregular abdominal tightening.   Pt states normal fetal movement and no LOF.     : Report to Dr. Petersen     : Dr. Petersen at bedside for speculum exam/ SVE/ Cervix closed/ thick  Orders to discharge     : Pt given AVS. This RN educated on dehydration prevention,  labor precautions, and vaginal bleeding precautions for 3rd trimester. Pt states understanding and states she has no further questions at this time    : Pt ambulated independently off unit with SO.

## 2023-10-30 NOTE — PROGRESS NOTES
"SUBJECTIVE:   Vivian is a 79 year old who presents for Preventive Visit.    Are you in the first 12 months of your Medicare coverage?  No    Seen today for annual wellness visit.  Diagnosed with salivary gland tumor, FNA with oncocytic features.  Per Dr. Howard of ENT this is likely benign, discussed observation versus resection, patient preferring observation.  She sought a second opinion from Dr. Thurston of [facility] who was in agreement.  She has not noted significant change though sometimes it feels more full in the morning.  She is interested in interventional radiology consultation to see if ablation may be a possibility as well.  She is not comfortable with the idea of surgery and the potential risks unless it is grown.  She will be due for follow-up imaging after 1 year.  History of impaired fasting glucose and hyperlipidemia due for follow-up.  Remains on levothyroxine and management of hypothyroidism, tolerating well with no recent missed doses.  She has a history of vitamin D deficiency, is taking a supplement irregularly.  History of osteopenia due for follow-up bone density scan in January.  She takes diazepam very rarely and sparingly, usually half of a tab, if she awakens early in the night and cannot fall back asleep.  When she does so, she awakens feeling well rested the next day.  Interested in having this on hand.    Healthy Habits:     In general, how would you rate your overall health?  Excellent    Frequency of exercise:  4-5 days/week    Duration of exercise:  Less than 15 minutes    Do you usually eat at least 4 servings of fruit and vegetables a day, include whole grains    & fiber and avoid regularly eating high fat or \"junk\" foods?  Yes    Taking medications regularly:  Yes    Medication side effects:  Not applicable    Ability to successfully perform activities of daily living:  No assistance needed    Home Safety:  No safety concerns identified    Hearing Impairment:  No hearing " Report received and assumed care    concerns    In the past 6 months, have you been bothered by leaking of urine?  No    In general, how would you rate your overall mental or emotional health?  Excellent    Additional concerns today:  Yes          Have you ever done Advance Care Planning? (For example, a Health Directive, POLST, or a discussion with a medical provider or your loved ones about your wishes): No, advance care planning information given to patient to review.  Advanced care planning was discussed at today's visit.       Fall risk  Fallen 2 or more times in the past year?: No  Any fall with injury in the past year?: No    Cognitive Screening   1) Repeat 3 items (Leader, Season, Table)    2) Clock draw: NORMAL  3) 3 item recall: Recalls 3 objects  Results: 3 items recalled: COGNITIVE IMPAIRMENT LESS LIKELY    Mini-CogTM Copyright S Mirna. Licensed by the author for use in [facility]; reprinted with permission (cori@[facility]). All rights reserved.        Reviewed and updated as needed this visit by clinical staff   Tobacco  Allergies  Meds              Reviewed and updated as needed this visit by Provider                 Social History     Tobacco Use    Smoking status: Never    Smokeless tobacco: Never   Substance Use Topics    Alcohol use: Yes     Alcohol/week: 3.0 standard drinks of alcohol             10/27/2023     1:47 PM   Alcohol Use   Prescreen: >3 drinks/day or >7 drinks/week? No     Do you have a current opioid prescription? No  Do you use any other controlled substances or medications that are not prescribed by a provider? None    Current providers sharing in care for this patient include:   Patient Care Team:  Kristine Bridges MD as PCP - General (Family Practice)  Kristine Bridges MD as Assigned PCP  Erasmo Howard MD as MD (Otolaryngology)  Erasmo Howard MD as Assigned Surgical Provider  Millicent Russ, PharmD as Assigned [facility] Pharmacist  Arabella Fontana RPH as Pharmacist (Pharmacist)    The  following health maintenance items are reviewed in Epic and correct as of today:  Health Maintenance   Topic Date Due    RSV VACCINE 60+ (1 - 1-dose 60+ series) Never done    INFLUENZA VACCINE (1) 09/01/2023    COVID-19 Vaccine (4 - 2023-24 season) 09/01/2023    ANNUAL REVIEW OF HM ORDERS  10/27/2023    MEDICARE ANNUAL WELLNESS VISIT  10/27/2023    TSH W/FREE T4 REFLEX  10/27/2023    DEXA  01/27/2024    COLORECTAL CANCER SCREENING  09/01/2024    FALL RISK ASSESSMENT  10/30/2024    DTAP/TDAP/TD IMMUNIZATION (2 - Td or Tdap) 12/05/2024    LIPID  10/27/2027    ADVANCE CARE PLANNING  10/27/2027    PHQ-2 (once per calendar year)  Completed    Pneumococcal Vaccine: 65+ Years  Completed    ZOSTER IMMUNIZATION  Completed    IPV IMMUNIZATION  Aged Out    HPV IMMUNIZATION  Aged Out    MENINGITIS IMMUNIZATION  Aged Out    HEPATITIS C SCREENING  Discontinued     Lab work is in process  Labs reviewed in EPIC  BP Readings from Last 3 Encounters:   10/30/23 136/88   03/27/23 (!) 167/78   03/02/23 124/60    Wt Readings from Last 3 Encounters:   10/30/23 59.5 kg (131 lb 1.6 oz)   03/27/23 63 kg (139 lb)   03/02/23 60.3 kg (133 lb)                  Patient Active Problem List   Diagnosis    Insomnia    Impaired Fasting Glucose    Hypothyroidism    Dyslipidemia    Serrated polyposis syndrome    Osteopenia, unspecified location    Vitamin D deficiency    Diverticular disease of large intestine    Benign tumor of parotid gland     Past Surgical History:   Procedure Laterality Date    LAPAROTOMY EXPLORATORY      Due to infertility    OVARIAN CYST SURGERY Right 12/26/2013    [facility]    TUBAL LIGATION         Social History     Tobacco Use    Smoking status: Never    Smokeless tobacco: Never   Substance Use Topics    Alcohol use: Yes     Alcohol/week: 3.0 standard drinks of alcohol     No family history on file.      Current Outpatient Medications   Medication Sig Dispense Refill    diazepam (VALIUM) 2 MG tablet Take 1 tablet (2 mg)  "by mouth nightly as needed for anxiety 30 tablet 0    aspirin 81 MG EC tablet Take 81 mg by mouth daily      levothyroxine (SYNTHROID/LEVOTHROID) 100 MCG tablet Take 1 tablet (100 mcg) by mouth daily 90 tablet 4     Allergies   Allergen Reactions    Penicillins Hives    Sulfa (Sulfonamide Antibiotics) [Sulfa Antibiotics] Other (See Comments)     Not sure if its her or her son.     Recent Labs   Lab Test 10/30/23  1555 10/27/22  1426 10/28/21  0927   A1C 5.8* 5.7* 5.7*   LDL 95 108* 107   HDL 66 66 61   TRIG 167* 167* 118   TSH 0.62 1.17 2.02        Pertinent mammograms are reviewed under the imaging tab.    Review of Systems   Constitutional:  Negative for chills and fever.   HENT:  Negative for congestion, ear pain, hearing loss and sore throat.    Eyes:  Negative for pain and visual disturbance.   Respiratory:  Negative for cough and shortness of breath.    Cardiovascular:  Negative for chest pain, palpitations and peripheral edema.   Gastrointestinal:  Negative for abdominal pain, constipation, diarrhea, heartburn, hematochezia and nausea.   Breasts:  Negative for breast mass and discharge.   Genitourinary:  Negative for dysuria, frequency, genital sores, hematuria, pelvic pain, urgency, vaginal bleeding and vaginal discharge.   Musculoskeletal:  Negative for arthralgias, joint swelling and myalgias.   Skin:  Negative for rash.   Neurological:  Negative for dizziness, weakness, headaches and paresthesias.   Psychiatric/Behavioral:  Negative for mood changes. The patient is not nervous/anxious.          OBJECTIVE:   /88   Pulse 70   Temp 98.1  F (36.7  C) (Oral)   Resp 16   Ht 1.619 m (5' 3.75\")   Wt 59.5 kg (131 lb 1.6 oz)   SpO2 99%   BMI 22.68 kg/m   Estimated body mass index is 22.68 kg/m  as calculated from the following:    Height as of this encounter: 1.619 m (5' 3.75\").    Weight as of this encounter: 59.5 kg (131 lb 1.6 oz).  Physical Exam  GENERAL APPEARANCE: healthy, alert and no " distress  EYES: Eyes grossly normal to inspection, PERRL and conjunctivae and sclerae normal  HENT: ear canals and TM's normal, nose and mouth without ulcers or lesions, oropharynx clear and oral mucous membranes moist  NECK: no adenopathy, no asymmetry, masses, or scars and thyroid normal to palpation  RESP: lungs clear to auscultation - no rales, rhonchi or wheezes  BREAST: normal without masses, tenderness or nipple discharge and no palpable axillary masses or adenopathy  CV: regular rate and rhythm, normal S1 S2, no S3 or S4, no murmur, click or rub, no peripheral edema and peripheral pulses strong  ABDOMEN: soft, nontender, no hepatosplenomegaly, no masses and bowel sounds normal  MS: no musculoskeletal defects are noted and gait is age appropriate without ataxia  SKIN: no suspicious lesions or rashes  NEURO: Normal strength and tone, sensory exam grossly normal, mentation intact and speech normal  PSYCH: mentation appears normal and affect normal/bright    Diagnostic Test Results:  Labs reviewed in Epic    ASSESSMENT / PLAN:     Medicare annual wellness visit, subsequent  At today's visit, we discussed lifestyle interventions to promote self-management and wellness, including maintenance of a healthy weight, healthy diet, regular physical activity and exercise, and falls prevention.  Seasonal influenza vaccine administered today.  She is considering COVID and RSV vaccinations but declines today.  Order placed for bone density scan in January.  Up-to-date with colon cancer screening.    Impaired Fasting Glucose  Encouraged efforts at healthy lifestyle habits.  We will check fasting glucose and fasting lipids today.  - Hemoglobin A1c; Future  - Hemoglobin A1c    Dyslipidemia  We will check fasting lipids today.  Encouraged healthy lifestyle habits.  - Lipid panel reflex to direct LDL Non-fasting; Future  - Lipid panel reflex to direct LDL Non-fasting    Hypothyroidism due to Hashimoto's thyroiditis  Continue  levothyroxine.  We will check TSH cascade today.  - TSH WITH FREE T4 REFLEX; Future  - TSH WITH FREE T4 REFLEX    Vitamin D deficiency  Continue supplement.  We will check vitamin D level today.  - Vitamin D Deficiency; Future  - Vitamin D Deficiency    Primary insomnia  This is primarily due to occasional early awakening, doing very well with a small dose of diazepam.  She is aware of risks of diazepam and feels risks are outweighed by benefits.  Continue diazepam sparingly.  - diazepam (VALIUM) 2 MG tablet; Take 1 tablet (2 mg) by mouth nightly as needed for anxiety    Osteopenia, unspecified location  Encourage weightbearing activities, measures to reduce risk of falls, and adequate calcium and vitamin D intake.  We will check vitamin D level today.  Order placed for bone density scan in January.  - Vitamin D Deficiency; Future  - DX Hip/Pelvis/Spine; Future  - Vitamin D Deficiency    Serrated polyposis syndrome  Colonoscopy follow-up will be due next year.    Benign tumor of parotid gland  Appreciate recommendations of ENT team.  Order placed for follow-up ultrasound.  Referral is placed to interventional radiology per patient request to discuss option of ablation treatment.  - IR Referral; Future  - US Parotid; Future    Asymptomatic postmenopausal status  We will check future bone density scan.  - DX Hip/Pelvis/Spine; Future         COUNSELING:  Reviewed preventive health counseling, as reflected in patient instructions        She reports that she has never smoked. She has never used smokeless tobacco.      Appropriate preventive services were discussed with this patient, including applicable screening as appropriate for fall prevention, nutrition, physical activity, Tobacco-use cessation, weight loss and cognition.  Checklist reviewing preventive services available has been given to the patient.    Reviewed patients plan of care and provided an AVS. The Basic Care Plan (routine screening as documented in  Health Maintenance) for Nithya meets the Care Plan requirement. This Care Plan has been established and reviewed with the Patient.          Kristine Bridges MD  [facility]    Identified Health Risks:  I have reviewed Opioid Use Disorder and Substance Use Disorder risk factors and made any needed referrals.

## 2023-11-29 ENCOUNTER — APPOINTMENT (OUTPATIENT)
Dept: ADMISSIONS | Facility: MEDICAL CENTER | Age: 23
End: 2023-11-29
Attending: OBSTETRICS & GYNECOLOGY
Payer: COMMERCIAL

## 2023-12-12 ENCOUNTER — PRE-ADMISSION TESTING (OUTPATIENT)
Dept: ADMISSIONS | Facility: MEDICAL CENTER | Age: 23
End: 2023-12-12
Attending: OBSTETRICS & GYNECOLOGY
Payer: COMMERCIAL

## 2023-12-15 ENCOUNTER — ANESTHESIA EVENT (OUTPATIENT)
Dept: OBGYN | Facility: MEDICAL CENTER | Age: 23
End: 2023-12-15
Payer: COMMERCIAL

## 2023-12-15 ENCOUNTER — HOSPITAL ENCOUNTER (INPATIENT)
Facility: MEDICAL CENTER | Age: 23
LOS: 3 days | End: 2023-12-18
Attending: OBSTETRICS & GYNECOLOGY | Admitting: OBSTETRICS & GYNECOLOGY
Payer: COMMERCIAL

## 2023-12-15 ENCOUNTER — ANESTHESIA (OUTPATIENT)
Dept: OBGYN | Facility: MEDICAL CENTER | Age: 23
End: 2023-12-15
Payer: COMMERCIAL

## 2023-12-15 LAB
BASOPHILS # BLD AUTO: 0.2 % (ref 0–1.8)
BASOPHILS # BLD: 0.02 K/UL (ref 0–0.12)
EOSINOPHIL # BLD AUTO: 0.02 K/UL (ref 0–0.51)
EOSINOPHIL NFR BLD: 0.2 % (ref 0–6.9)
ERYTHROCYTE [DISTWIDTH] IN BLOOD BY AUTOMATED COUNT: 44.1 FL (ref 35.9–50)
ERYTHROCYTE [DISTWIDTH] IN BLOOD BY AUTOMATED COUNT: 44.3 FL (ref 35.9–50)
HCT VFR BLD AUTO: 34.6 % (ref 37–47)
HCT VFR BLD AUTO: 37 % (ref 37–47)
HGB BLD-MCNC: 11.3 G/DL (ref 12–16)
HGB BLD-MCNC: 12.3 G/DL (ref 12–16)
HOLDING TUBE BB 8507: NORMAL
IMM GRANULOCYTES # BLD AUTO: 0.13 K/UL (ref 0–0.11)
IMM GRANULOCYTES NFR BLD AUTO: 1.4 % (ref 0–0.9)
LYMPHOCYTES # BLD AUTO: 2.37 K/UL (ref 1–4.8)
LYMPHOCYTES NFR BLD: 24.8 % (ref 22–41)
MCH RBC QN AUTO: 27.2 PG (ref 27–33)
MCH RBC QN AUTO: 27.5 PG (ref 27–33)
MCHC RBC AUTO-ENTMCNC: 32.7 G/DL (ref 32.2–35.5)
MCHC RBC AUTO-ENTMCNC: 33.2 G/DL (ref 32.2–35.5)
MCV RBC AUTO: 82.6 FL (ref 81.4–97.8)
MCV RBC AUTO: 83.2 FL (ref 81.4–97.8)
MONOCYTES # BLD AUTO: 0.48 K/UL (ref 0–0.85)
MONOCYTES NFR BLD AUTO: 5 % (ref 0–13.4)
NEUTROPHILS # BLD AUTO: 6.54 K/UL (ref 1.82–7.42)
NEUTROPHILS NFR BLD: 68.4 % (ref 44–72)
NRBC # BLD AUTO: 0 K/UL
NRBC BLD-RTO: 0 /100 WBC (ref 0–0.2)
PLATELET # BLD AUTO: 257 K/UL (ref 164–446)
PLATELET # BLD AUTO: 262 K/UL (ref 164–446)
PMV BLD AUTO: 9.1 FL (ref 9–12.9)
PMV BLD AUTO: 9.2 FL (ref 9–12.9)
RBC # BLD AUTO: 4.16 M/UL (ref 4.2–5.4)
RBC # BLD AUTO: 4.48 M/UL (ref 4.2–5.4)
T PALLIDUM AB SER QL IA: NORMAL
WBC # BLD AUTO: 14.7 K/UL (ref 4.8–10.8)
WBC # BLD AUTO: 9.6 K/UL (ref 4.8–10.8)

## 2023-12-15 PROCEDURE — 86780 TREPONEMA PALLIDUM: CPT

## 2023-12-15 PROCEDURE — 700105 HCHG RX REV CODE 258: Performed by: ANESTHESIOLOGY

## 2023-12-15 PROCEDURE — 700111 HCHG RX REV CODE 636 W/ 250 OVERRIDE (IP): Performed by: ANESTHESIOLOGY

## 2023-12-15 PROCEDURE — 85025 COMPLETE CBC W/AUTO DIFF WBC: CPT

## 2023-12-15 PROCEDURE — 160035 HCHG PACU - 1ST 60 MINS PHASE I: Performed by: OBSTETRICS & GYNECOLOGY

## 2023-12-15 PROCEDURE — 700111 HCHG RX REV CODE 636 W/ 250 OVERRIDE (IP): Mod: JZ | Performed by: ANESTHESIOLOGY

## 2023-12-15 PROCEDURE — 160009 HCHG ANES TIME/MIN: Performed by: OBSTETRICS & GYNECOLOGY

## 2023-12-15 PROCEDURE — 160029 HCHG SURGERY MINUTES - 1ST 30 MINS LEVEL 4: Performed by: OBSTETRICS & GYNECOLOGY

## 2023-12-15 PROCEDURE — 36415 COLL VENOUS BLD VENIPUNCTURE: CPT

## 2023-12-15 PROCEDURE — 160048 HCHG OR STATISTICAL LEVEL 1-5: Performed by: OBSTETRICS & GYNECOLOGY

## 2023-12-15 PROCEDURE — 700111 HCHG RX REV CODE 636 W/ 250 OVERRIDE (IP): Mod: JZ | Performed by: OBSTETRICS & GYNECOLOGY

## 2023-12-15 PROCEDURE — 700101 HCHG RX REV CODE 250: Performed by: ANESTHESIOLOGY

## 2023-12-15 PROCEDURE — 160041 HCHG SURGERY MINUTES - EA ADDL 1 MIN LEVEL 4: Performed by: OBSTETRICS & GYNECOLOGY

## 2023-12-15 PROCEDURE — C1755 CATHETER, INTRASPINAL: HCPCS | Performed by: OBSTETRICS & GYNECOLOGY

## 2023-12-15 PROCEDURE — 770002 HCHG ROOM/CARE - OB PRIVATE (112)

## 2023-12-15 PROCEDURE — 700102 HCHG RX REV CODE 250 W/ 637 OVERRIDE(OP): Performed by: ANESTHESIOLOGY

## 2023-12-15 PROCEDURE — 85027 COMPLETE CBC AUTOMATED: CPT

## 2023-12-15 PROCEDURE — 160002 HCHG RECOVERY MINUTES (STAT): Performed by: OBSTETRICS & GYNECOLOGY

## 2023-12-15 PROCEDURE — A9270 NON-COVERED ITEM OR SERVICE: HCPCS | Performed by: ANESTHESIOLOGY

## 2023-12-15 RX ORDER — SODIUM CHLORIDE, SODIUM LACTATE, POTASSIUM CHLORIDE, CALCIUM CHLORIDE 600; 310; 30; 20 MG/100ML; MG/100ML; MG/100ML; MG/100ML
INJECTION, SOLUTION INTRAVENOUS CONTINUOUS
Status: DISCONTINUED | OUTPATIENT
Start: 2023-12-15 | End: 2023-12-18 | Stop reason: HOSPADM

## 2023-12-15 RX ORDER — SODIUM CHLORIDE, SODIUM LACTATE, POTASSIUM CHLORIDE, CALCIUM CHLORIDE 600; 310; 30; 20 MG/100ML; MG/100ML; MG/100ML; MG/100ML
INJECTION, SOLUTION INTRAVENOUS CONTINUOUS
Status: DISCONTINUED | OUTPATIENT
Start: 2023-12-15 | End: 2023-12-15

## 2023-12-15 RX ORDER — HYDROMORPHONE HYDROCHLORIDE 1 MG/ML
0.2 INJECTION, SOLUTION INTRAMUSCULAR; INTRAVENOUS; SUBCUTANEOUS
Status: ACTIVE | OUTPATIENT
Start: 2023-12-15 | End: 2023-12-16

## 2023-12-15 RX ORDER — SIMETHICONE 125 MG
125 TABLET,CHEWABLE ORAL 4 TIMES DAILY PRN
Status: DISCONTINUED | OUTPATIENT
Start: 2023-12-15 | End: 2023-12-18 | Stop reason: HOSPADM

## 2023-12-15 RX ORDER — METOCLOPRAMIDE HYDROCHLORIDE 5 MG/ML
10 INJECTION INTRAMUSCULAR; INTRAVENOUS ONCE
Status: COMPLETED | OUTPATIENT
Start: 2023-12-15 | End: 2023-12-15

## 2023-12-15 RX ORDER — HYDRALAZINE HYDROCHLORIDE 20 MG/ML
5 INJECTION INTRAMUSCULAR; INTRAVENOUS
Status: DISCONTINUED | OUTPATIENT
Start: 2023-12-15 | End: 2023-12-15 | Stop reason: HOSPADM

## 2023-12-15 RX ORDER — SODIUM CHLORIDE, SODIUM LACTATE, POTASSIUM CHLORIDE, CALCIUM CHLORIDE 600; 310; 30; 20 MG/100ML; MG/100ML; MG/100ML; MG/100ML
INJECTION, SOLUTION INTRAVENOUS ONCE
Status: ACTIVE | OUTPATIENT
Start: 2023-12-15 | End: 2023-12-16

## 2023-12-15 RX ORDER — VITAMIN A ACETATE, BETA CAROTENE, ASCORBIC ACID, CHOLECALCIFEROL, .ALPHA.-TOCOPHEROL ACETATE, DL-, THIAMINE MONONITRATE, RIBOFLAVIN, NIACINAMIDE, PYRIDOXINE HYDROCHLORIDE, FOLIC ACID, CYANOCOBALAMIN, CALCIUM CARBONATE, FERROUS FUMARATE, ZINC OXIDE, CUPRIC OXIDE 3080; 12; 120; 400; 1; 1.84; 3; 20; 22; 920; 25; 200; 27; 10; 2 [IU]/1; UG/1; MG/1; [IU]/1; MG/1; MG/1; MG/1; MG/1; MG/1; [IU]/1; MG/1; MG/1; MG/1; MG/1; MG/1
1 TABLET, FILM COATED ORAL
Status: DISCONTINUED | OUTPATIENT
Start: 2023-12-16 | End: 2023-12-18 | Stop reason: HOSPADM

## 2023-12-15 RX ORDER — IBUPROFEN 800 MG/1
800 TABLET ORAL EVERY 8 HOURS
Status: DISCONTINUED | OUTPATIENT
Start: 2023-12-16 | End: 2023-12-18 | Stop reason: HOSPADM

## 2023-12-15 RX ORDER — ONDANSETRON 2 MG/ML
4 INJECTION INTRAMUSCULAR; INTRAVENOUS EVERY 6 HOURS PRN
Status: ACTIVE | OUTPATIENT
Start: 2023-12-15 | End: 2023-12-16

## 2023-12-15 RX ORDER — PHENYLEPHRINE HYDROCHLORIDE 10 MG/ML
INJECTION, SOLUTION INTRAMUSCULAR; INTRAVENOUS; SUBCUTANEOUS PRN
Status: DISCONTINUED | OUTPATIENT
Start: 2023-12-15 | End: 2023-12-15 | Stop reason: SURG

## 2023-12-15 RX ORDER — SODIUM CHLORIDE, SODIUM LACTATE, POTASSIUM CHLORIDE, CALCIUM CHLORIDE 600; 310; 30; 20 MG/100ML; MG/100ML; MG/100ML; MG/100ML
INJECTION, SOLUTION INTRAVENOUS PRN
Status: DISCONTINUED | OUTPATIENT
Start: 2023-12-15 | End: 2023-12-18 | Stop reason: HOSPADM

## 2023-12-15 RX ORDER — OXYTOCIN 10 [USP'U]/ML
10 INJECTION, SOLUTION INTRAMUSCULAR; INTRAVENOUS
Status: DISCONTINUED | OUTPATIENT
Start: 2023-12-15 | End: 2023-12-18 | Stop reason: HOSPADM

## 2023-12-15 RX ORDER — OXYCODONE HYDROCHLORIDE 5 MG/1
5 TABLET ORAL EVERY 4 HOURS PRN
Status: DISCONTINUED | OUTPATIENT
Start: 2023-12-16 | End: 2023-12-18 | Stop reason: HOSPADM

## 2023-12-15 RX ORDER — ACETAMINOPHEN 500 MG
1000 TABLET ORAL EVERY 6 HOURS
Status: COMPLETED | OUTPATIENT
Start: 2023-12-15 | End: 2023-12-16

## 2023-12-15 RX ORDER — OXYCODONE HCL 5 MG/5 ML
5 SOLUTION, ORAL ORAL
Status: DISCONTINUED | OUTPATIENT
Start: 2023-12-15 | End: 2023-12-15 | Stop reason: HOSPADM

## 2023-12-15 RX ORDER — DOCUSATE SODIUM 100 MG/1
100 CAPSULE, LIQUID FILLED ORAL 2 TIMES DAILY PRN
Status: DISCONTINUED | OUTPATIENT
Start: 2023-12-15 | End: 2023-12-18 | Stop reason: HOSPADM

## 2023-12-15 RX ORDER — MEPERIDINE HYDROCHLORIDE 25 MG/ML
12.5 INJECTION INTRAMUSCULAR; INTRAVENOUS; SUBCUTANEOUS
Status: DISCONTINUED | OUTPATIENT
Start: 2023-12-15 | End: 2023-12-15 | Stop reason: HOSPADM

## 2023-12-15 RX ORDER — CEFAZOLIN SODIUM 1 G/3ML
2 INJECTION, POWDER, FOR SOLUTION INTRAMUSCULAR; INTRAVENOUS ONCE
Status: COMPLETED | OUTPATIENT
Start: 2023-12-15 | End: 2023-12-15

## 2023-12-15 RX ORDER — ACETAMINOPHEN 500 MG
1000 TABLET ORAL EVERY 6 HOURS
Status: DISCONTINUED | OUTPATIENT
Start: 2023-12-16 | End: 2023-12-18 | Stop reason: HOSPADM

## 2023-12-15 RX ORDER — MISOPROSTOL 200 UG/1
800 TABLET ORAL
Status: DISCONTINUED | OUTPATIENT
Start: 2023-12-15 | End: 2023-12-18 | Stop reason: HOSPADM

## 2023-12-15 RX ORDER — CITRIC ACID/SODIUM CITRATE 334-500MG
30 SOLUTION, ORAL ORAL ONCE
Status: COMPLETED | OUTPATIENT
Start: 2023-12-15 | End: 2023-12-15

## 2023-12-15 RX ORDER — BUPIVACAINE HYDROCHLORIDE 7.5 MG/ML
INJECTION, SOLUTION INTRASPINAL
Status: COMPLETED | OUTPATIENT
Start: 2023-12-15 | End: 2023-12-15

## 2023-12-15 RX ORDER — OXYTOCIN 10 [USP'U]/ML
INJECTION, SOLUTION INTRAMUSCULAR; INTRAVENOUS PRN
Status: DISCONTINUED | OUTPATIENT
Start: 2023-12-15 | End: 2023-12-15 | Stop reason: SURG

## 2023-12-15 RX ORDER — IPRATROPIUM BROMIDE AND ALBUTEROL SULFATE 2.5; .5 MG/3ML; MG/3ML
3 SOLUTION RESPIRATORY (INHALATION)
Status: DISCONTINUED | OUTPATIENT
Start: 2023-12-15 | End: 2023-12-15 | Stop reason: HOSPADM

## 2023-12-15 RX ORDER — DEXAMETHASONE SODIUM PHOSPHATE 4 MG/ML
INJECTION, SOLUTION INTRA-ARTICULAR; INTRALESIONAL; INTRAMUSCULAR; INTRAVENOUS; SOFT TISSUE PRN
Status: DISCONTINUED | OUTPATIENT
Start: 2023-12-15 | End: 2023-12-15 | Stop reason: SURG

## 2023-12-15 RX ORDER — MORPHINE SULFATE 0.5 MG/ML
INJECTION, SOLUTION EPIDURAL; INTRATHECAL; INTRAVENOUS
Status: COMPLETED | OUTPATIENT
Start: 2023-12-15 | End: 2023-12-15

## 2023-12-15 RX ORDER — ACETAMINOPHEN 500 MG
1000 TABLET ORAL EVERY 6 HOURS PRN
Status: DISCONTINUED | OUTPATIENT
Start: 2023-12-19 | End: 2023-12-18 | Stop reason: HOSPADM

## 2023-12-15 RX ORDER — KETOROLAC TROMETHAMINE 30 MG/ML
30 INJECTION, SOLUTION INTRAMUSCULAR; INTRAVENOUS EVERY 6 HOURS
Status: COMPLETED | OUTPATIENT
Start: 2023-12-15 | End: 2023-12-16

## 2023-12-15 RX ORDER — OXYCODONE HYDROCHLORIDE 5 MG/1
5 TABLET ORAL EVERY 4 HOURS PRN
Status: ACTIVE | OUTPATIENT
Start: 2023-12-15 | End: 2023-12-16

## 2023-12-15 RX ORDER — SODIUM CHLORIDE, SODIUM GLUCONATE, SODIUM ACETATE, POTASSIUM CHLORIDE AND MAGNESIUM CHLORIDE 526; 502; 368; 37; 30 MG/100ML; MG/100ML; MG/100ML; MG/100ML; MG/100ML
INJECTION, SOLUTION INTRAVENOUS
Status: DISCONTINUED | OUTPATIENT
Start: 2023-12-15 | End: 2023-12-15 | Stop reason: SURG

## 2023-12-15 RX ORDER — ONDANSETRON 4 MG/1
4 TABLET, ORALLY DISINTEGRATING ORAL EVERY 6 HOURS PRN
Status: DISCONTINUED | OUTPATIENT
Start: 2023-12-16 | End: 2023-12-18 | Stop reason: HOSPADM

## 2023-12-15 RX ORDER — MIDAZOLAM HYDROCHLORIDE 1 MG/ML
1 INJECTION INTRAMUSCULAR; INTRAVENOUS
Status: DISCONTINUED | OUTPATIENT
Start: 2023-12-15 | End: 2023-12-15 | Stop reason: HOSPADM

## 2023-12-15 RX ORDER — IBUPROFEN 800 MG/1
800 TABLET ORAL EVERY 8 HOURS PRN
Status: DISCONTINUED | OUTPATIENT
Start: 2023-12-19 | End: 2023-12-18 | Stop reason: HOSPADM

## 2023-12-15 RX ORDER — CALCIUM CARBONATE 500 MG/1
1000 TABLET, CHEWABLE ORAL EVERY 6 HOURS PRN
Status: DISCONTINUED | OUTPATIENT
Start: 2023-12-15 | End: 2023-12-18 | Stop reason: HOSPADM

## 2023-12-15 RX ORDER — DIPHENHYDRAMINE HYDROCHLORIDE 50 MG/ML
25 INJECTION INTRAMUSCULAR; INTRAVENOUS EVERY 6 HOURS PRN
Status: DISCONTINUED | OUTPATIENT
Start: 2023-12-16 | End: 2023-12-18 | Stop reason: HOSPADM

## 2023-12-15 RX ORDER — HYDROMORPHONE HYDROCHLORIDE 1 MG/ML
0.1 INJECTION, SOLUTION INTRAMUSCULAR; INTRAVENOUS; SUBCUTANEOUS
Status: DISCONTINUED | OUTPATIENT
Start: 2023-12-15 | End: 2023-12-15 | Stop reason: HOSPADM

## 2023-12-15 RX ORDER — DIPHENHYDRAMINE HYDROCHLORIDE 50 MG/ML
25 INJECTION INTRAMUSCULAR; INTRAVENOUS EVERY 6 HOURS PRN
Status: ACTIVE | OUTPATIENT
Start: 2023-12-15 | End: 2023-12-16

## 2023-12-15 RX ORDER — DIPHENHYDRAMINE HCL 25 MG
25 TABLET ORAL EVERY 6 HOURS PRN
Status: DISCONTINUED | OUTPATIENT
Start: 2023-12-16 | End: 2023-12-18 | Stop reason: HOSPADM

## 2023-12-15 RX ORDER — ONDANSETRON 2 MG/ML
4 INJECTION INTRAMUSCULAR; INTRAVENOUS EVERY 6 HOURS PRN
Status: DISCONTINUED | OUTPATIENT
Start: 2023-12-16 | End: 2023-12-18 | Stop reason: HOSPADM

## 2023-12-15 RX ORDER — OXYCODONE HYDROCHLORIDE 10 MG/1
10 TABLET ORAL EVERY 4 HOURS PRN
Status: DISPENSED | OUTPATIENT
Start: 2023-12-15 | End: 2023-12-16

## 2023-12-15 RX ORDER — CEFAZOLIN SODIUM 1 G/3ML
2 INJECTION, POWDER, FOR SOLUTION INTRAMUSCULAR; INTRAVENOUS ONCE
Status: DISCONTINUED | OUTPATIENT
Start: 2023-12-15 | End: 2023-12-15

## 2023-12-15 RX ORDER — HYDROMORPHONE HYDROCHLORIDE 1 MG/ML
0.2 INJECTION, SOLUTION INTRAMUSCULAR; INTRAVENOUS; SUBCUTANEOUS
Status: DISCONTINUED | OUTPATIENT
Start: 2023-12-15 | End: 2023-12-15 | Stop reason: HOSPADM

## 2023-12-15 RX ORDER — OXYCODONE HCL 5 MG/5 ML
10 SOLUTION, ORAL ORAL
Status: DISCONTINUED | OUTPATIENT
Start: 2023-12-15 | End: 2023-12-15 | Stop reason: HOSPADM

## 2023-12-15 RX ORDER — OXYCODONE HYDROCHLORIDE 10 MG/1
10 TABLET ORAL EVERY 4 HOURS PRN
Status: DISCONTINUED | OUTPATIENT
Start: 2023-12-16 | End: 2023-12-18 | Stop reason: HOSPADM

## 2023-12-15 RX ORDER — HALOPERIDOL 5 MG/ML
1 INJECTION INTRAMUSCULAR
Status: DISCONTINUED | OUTPATIENT
Start: 2023-12-15 | End: 2023-12-15 | Stop reason: HOSPADM

## 2023-12-15 RX ORDER — DIPHENHYDRAMINE HYDROCHLORIDE 50 MG/ML
12.5 INJECTION INTRAMUSCULAR; INTRAVENOUS EVERY 6 HOURS PRN
Status: ACTIVE | OUTPATIENT
Start: 2023-12-15 | End: 2023-12-16

## 2023-12-15 RX ORDER — SODIUM CHLORIDE, SODIUM GLUCONATE, SODIUM ACETATE, POTASSIUM CHLORIDE AND MAGNESIUM CHLORIDE 526; 502; 368; 37; 30 MG/100ML; MG/100ML; MG/100ML; MG/100ML; MG/100ML
1500 INJECTION, SOLUTION INTRAVENOUS ONCE
Status: COMPLETED | OUTPATIENT
Start: 2023-12-15 | End: 2023-12-15

## 2023-12-15 RX ORDER — EPHEDRINE SULFATE 50 MG/ML
10 INJECTION, SOLUTION INTRAVENOUS
Status: ACTIVE | OUTPATIENT
Start: 2023-12-15 | End: 2023-12-16

## 2023-12-15 RX ORDER — ONDANSETRON 2 MG/ML
4 INJECTION INTRAMUSCULAR; INTRAVENOUS
Status: DISCONTINUED | OUTPATIENT
Start: 2023-12-15 | End: 2023-12-15 | Stop reason: HOSPADM

## 2023-12-15 RX ORDER — LABETALOL HYDROCHLORIDE 5 MG/ML
5 INJECTION, SOLUTION INTRAVENOUS
Status: DISCONTINUED | OUTPATIENT
Start: 2023-12-15 | End: 2023-12-15 | Stop reason: HOSPADM

## 2023-12-15 RX ORDER — MORPHINE SULFATE 2 MG/ML
INJECTION, SOLUTION INTRAMUSCULAR; INTRAVENOUS PRN
Status: DISCONTINUED | OUTPATIENT
Start: 2023-12-15 | End: 2023-12-15 | Stop reason: SURG

## 2023-12-15 RX ORDER — HYDROMORPHONE HYDROCHLORIDE 1 MG/ML
0.4 INJECTION, SOLUTION INTRAMUSCULAR; INTRAVENOUS; SUBCUTANEOUS
Status: DISCONTINUED | OUTPATIENT
Start: 2023-12-15 | End: 2023-12-15 | Stop reason: HOSPADM

## 2023-12-15 RX ORDER — KETOROLAC TROMETHAMINE 30 MG/ML
INJECTION, SOLUTION INTRAMUSCULAR; INTRAVENOUS PRN
Status: DISCONTINUED | OUTPATIENT
Start: 2023-12-15 | End: 2023-12-15 | Stop reason: SURG

## 2023-12-15 RX ORDER — ONDANSETRON 2 MG/ML
INJECTION INTRAMUSCULAR; INTRAVENOUS PRN
Status: DISCONTINUED | OUTPATIENT
Start: 2023-12-15 | End: 2023-12-15 | Stop reason: SURG

## 2023-12-15 RX ADMIN — KETOROLAC TROMETHAMINE 30 MG: 30 INJECTION, SOLUTION INTRAMUSCULAR; INTRAVENOUS at 20:12

## 2023-12-15 RX ADMIN — METOCLOPRAMIDE 10 MG: 5 INJECTION, SOLUTION INTRAMUSCULAR; INTRAVENOUS at 11:34

## 2023-12-15 RX ADMIN — ACETAMINOPHEN 1000 MG: 500 TABLET, FILM COATED ORAL at 18:17

## 2023-12-15 RX ADMIN — SODIUM CHLORIDE, SODIUM GLUCONATE, SODIUM ACETATE, POTASSIUM CHLORIDE AND MAGNESIUM CHLORIDE 1000 ML: 526; 502; 368; 37; 30 INJECTION, SOLUTION INTRAVENOUS at 11:00

## 2023-12-15 RX ADMIN — PHENYLEPHRINE HYDROCHLORIDE 100 MCG: 10 INJECTION INTRAVENOUS at 12:21

## 2023-12-15 RX ADMIN — PHENYLEPHRINE HYDROCHLORIDE 200 MCG: 10 INJECTION INTRAVENOUS at 12:28

## 2023-12-15 RX ADMIN — PHENYLEPHRINE HYDROCHLORIDE 100 MCG: 10 INJECTION INTRAVENOUS at 12:04

## 2023-12-15 RX ADMIN — OXYTOCIN 20 UNITS: 10 INJECTION, SOLUTION INTRAMUSCULAR; INTRAVENOUS at 12:26

## 2023-12-15 RX ADMIN — BUPIVACAINE HYDROCHLORIDE IN DEXTROSE 1.7 ML: 7.5 INJECTION, SOLUTION SUBARACHNOID at 11:59

## 2023-12-15 RX ADMIN — MORPHINE SULFATE 100 MCG: 0.5 INJECTION, SOLUTION EPIDURAL; INTRATHECAL; INTRAVENOUS at 11:59

## 2023-12-15 RX ADMIN — SODIUM CHLORIDE, SODIUM GLUCONATE, SODIUM ACETATE, POTASSIUM CHLORIDE AND MAGNESIUM CHLORIDE: 526; 502; 368; 37; 30 INJECTION, SOLUTION INTRAVENOUS at 12:48

## 2023-12-15 RX ADMIN — SODIUM CHLORIDE, SODIUM GLUCONATE, SODIUM ACETATE, POTASSIUM CHLORIDE AND MAGNESIUM CHLORIDE: 526; 502; 368; 37; 30 INJECTION, SOLUTION INTRAVENOUS at 11:53

## 2023-12-15 RX ADMIN — CEFAZOLIN 2 G: 1 INJECTION, POWDER, FOR SOLUTION INTRAMUSCULAR; INTRAVENOUS at 12:00

## 2023-12-15 RX ADMIN — PHENYLEPHRINE HYDROCHLORIDE 100 MCG: 10 INJECTION INTRAVENOUS at 11:59

## 2023-12-15 RX ADMIN — ONDANSETRON 4 MG: 2 INJECTION INTRAMUSCULAR; INTRAVENOUS at 12:01

## 2023-12-15 RX ADMIN — PHENYLEPHRINE HYDROCHLORIDE 100 MCG: 10 INJECTION INTRAVENOUS at 12:14

## 2023-12-15 RX ADMIN — DEXAMETHASONE SODIUM PHOSPHATE 8 MG: 4 INJECTION INTRA-ARTICULAR; INTRALESIONAL; INTRAMUSCULAR; INTRAVENOUS; SOFT TISSUE at 12:01

## 2023-12-15 RX ADMIN — MORPHINE SULFATE 2 MG: 2 INJECTION, SOLUTION INTRAMUSCULAR; INTRAVENOUS at 12:52

## 2023-12-15 RX ADMIN — SODIUM CITRATE AND CITRIC ACID MONOHYDRATE 30 ML: 334; 500 SOLUTION ORAL at 11:33

## 2023-12-15 RX ADMIN — PHENYLEPHRINE HYDROCHLORIDE 100 MCG: 10 INJECTION INTRAVENOUS at 12:09

## 2023-12-15 RX ADMIN — PHENYLEPHRINE HYDROCHLORIDE 100 MCG: 10 INJECTION INTRAVENOUS at 12:05

## 2023-12-15 RX ADMIN — FAMOTIDINE 20 MG: 10 INJECTION, SOLUTION INTRAVENOUS at 11:34

## 2023-12-15 RX ADMIN — KETOROLAC TROMETHAMINE 30 MG: 30 INJECTION, SOLUTION INTRAMUSCULAR; INTRAVENOUS at 13:02

## 2023-12-15 ASSESSMENT — PATIENT HEALTH QUESTIONNAIRE - PHQ9
1. LITTLE INTEREST OR PLEASURE IN DOING THINGS: NOT AT ALL
2. FEELING DOWN, DEPRESSED, IRRITABLE, OR HOPELESS: NOT AT ALL
SUM OF ALL RESPONSES TO PHQ9 QUESTIONS 1 AND 2: 0

## 2023-12-15 ASSESSMENT — PAIN SCALES - GENERAL: PAIN_LEVEL: 0

## 2023-12-15 ASSESSMENT — PAIN DESCRIPTION - PAIN TYPE
TYPE: ACUTE PAIN
TYPE: SURGICAL PAIN
TYPE: SURGICAL PAIN

## 2023-12-15 NOTE — PROGRESS NOTES
Pt arrived to room 335 from L&D via gurney transport. Pt transferred to bed. Bedside report received from Teresa STOKES. Pt and SO oriented to room and unit, pain management options, christian care, POC and safety precautions. Siderails up x2, bed level down, call light within reach, no further questions or concerns at this time.  Infant placed into open crib, ID bands verified, cuddles tag observed. Parents oriented to room, unit, feeding schedule, diapering, infant safety and security. Questions answered and pt verbalizes understanding.

## 2023-12-15 NOTE — ANESTHESIA POSTPROCEDURE EVALUATION
Patient: Odette aHrris    Procedure Summary       Date: 12/15/23 Room / Location: [facility] OR 01 / SURGERY LABOR AND DELIVERY    Anesthesia Start: 1153 Anesthesia Stop: 1311    Procedure: REPEAT  SECTION (Abdomen) Diagnosis:       Status post repeat low transverse  section      (status post repeat  section)    Surgeons: Cari Law M.D. Responsible Provider: Simeon Zhou M.D.    Anesthesia Type: spinal ASA Status: 2            Final Anesthesia Type: spinal  Last vitals  BP   Blood Pressure: 124/59    Temp   36.3 °C (97.3 °F)    Pulse   96   Resp   16    SpO2   97 %      Anesthesia Post Evaluation    Patient location during evaluation: PACU  Patient participation: complete - patient participated  Level of consciousness: awake and alert  Pain score: 0    Airway patency: patent  Anesthetic complications: no  Cardiovascular status: hemodynamically stable  Respiratory status: acceptable  Hydration status: euvolemic    PONV: none          There were no known notable events for this encounter.     Nurse Pain Score: 0 (NPRS)

## 2023-12-15 NOTE — ANESTHESIA PREPROCEDURE EVALUATION
Case: [id number] Date/Time: 12/15/23 1145    Procedure: REPEAT  SECTION    Pre-op diagnosis: HISTORY OF  SECTION - 39W;3D    Location: [facility] OR 01 / SURGERY LABOR AND DELIVERY    Surgeons: Cari Law M.D.           for repeat  delivery.     CS for macrosomia with first pregnancy. No anesthesia issues.     Vazquez.     Full term.       Physical Exam    Airway   Mallampati: II  TM distance: >3 FB  Neck ROM: full       Cardiovascular - normal exam  Rhythm: regular  Rate: normal  (-) murmur     Dental - normal exam           Pulmonary - normal exam  Breath sounds clear to auscultation     Abdominal    Neurological - normal exam                   Anesthesia Plan    ASA 2       Plan - spinal   Neuraxial block will be primary anesthetic                Postoperative Plan: Postoperative administration of opioids is intended.    Pertinent diagnostic labs and testing reviewed    Informed Consent:    Anesthetic plan and risks discussed with patient.

## 2023-12-15 NOTE — PROGRESS NOTES
[id number]: Met patient in [facility]. Patient is  , here for a scheduled repeat  section. Patient prepared for surgery. Admission intake completed, orders placed. Consents for surgery and anesthesia signed.    1224: Birth of baby boyShalom. APGARs 8/9. Nuchal x 1 reduced prior to delivery.    1309: Arrival to PACU 2 from OR 2.    1411: Departure from PACU 2.    1414: Arrival to Postpartum unit .    1430: ID bands verified with DIVYA Rivas. Bedside report given to DIVYA Rivas.

## 2023-12-15 NOTE — H&P
HISTORY OF PRESENT ILLNESS:  The patient is a 23-year-old female,  who   presented at 39 and 3/7th weeks' gestation with an EDC of 2023 for   scheduled repeat  section.  The patient had closely spaced   pregnancies, but has had an uneventful prenatal course.  She had cell-free DNA   testing, low risk male infant.  She declined maternal serum alpha fetoprotein   testing.  She had a fetal survey, which was normal male infant anterior   placenta.  She had a 32-week growth scan, which did show the estimated fetal   weight was in the 53rd percentile.  She is a previous  for macrosomia   and is for repeat with no tubal ligation.  She had a normal 1-hour Glucola.    She did receive her Tdap.  Her group B strep is negative.     PAST MEDICAL HISTORY:  Noncontributory.     PAST SURGICAL HISTORY:  Previous , 2022.     OBSTETRIC HISTORY:  .  On 2022, she had a 39 weeks' gestation   primary , 8 pounds 5 ounces and she named Nevin      GYNECOLOGIC HISTORY:  Last menstrual period, 2023.  She has no history   of abnormal Paps or STDs including no history of herpes simplex virus.     FAMILY HISTORY:  Noncontributory.     SOCIAL HISTORY:  She is a stay at home mom.  Her partner's name is Hamilton.    Denies any alcohol, tobacco, or drug use.     ALLERGIES:  No known drug allergies.     CURRENT MEDICATIONS:  Include only prenatal vitamins.     PHYSICAL EXAMINATION:    VITAL SIGNS:  In the office earlier this week, blood pressure 108/70, weight   139, afebrile.  GENERAL:  Awake, alert, oriented, no acute distress.  CARDIOVASCULAR:  Regular rate and rhythm.  CHEST:  Clear to auscultation bilaterally.  ABDOMEN:  Gravid, nontender, nondistended, normal bowel sounds.  EXTREMITIES:  No cyanosis, clubbing, or edema.  PELVIC:  Sterile vaginal exam, she was fingertip thickened, -3 station when   she was checked earlier this week.     LABORATORY DATA:  Gonorrhea, chlamydia,  and trichomonas testing was negative.    Cell-free DNA testing was low risk.  MSAFP was declined.  One-hour Glucola   was 105.  CBC was normal.  RPR nonreactive.  She is O positive, antibody   screen negative, hepatitis B surface antigen negative, hepatitis C antibody   negative, RPR nonreactive, rubella is immune, and group B strep is negative.     ASSESSMENT AND PLAN:  A 23-year-old  who presented to labor and delivery   at 39 weeks gestation for scheduled repeat  section.     Previous  section, declines trial of labor.  Her first  was   for suspected macrosomia, 8 pounds 5 ounces.  This seems like a much smaller   child than her first.  She has had closely spaced pregnancies.  Risks,   benefits, and alternatives have been thoroughly discussed with her risks   including bleeding, infection, pain, injury to bowel, bladder, uterus,   fallopian tubes, ovaries, major blood vessels and nerves.  She does accept the   use of blood products in the event of hemorrhage.  All questions were   answered prior to her signing  consent forms.  She is not for tubal   ligation.        ______________________________  MD SIMI ABDI/TRIPP/ANABEL    DD:  2023 19:04  DT:  2023 20:05    Job#:  [id number]

## 2023-12-15 NOTE — OR SURGEON
Immediate Post OP Note    PreOp Diagnosis:   IUP at 39 weeks  Previous  section desiring repeat  Closely spaced pregnancies      PostOp Diagnosis:   IUP at 39 weeks  Previous  section desiring repeat  Closely spaced pregnancies    Procedure(s):  REPEAT low transverse  SECTION - Wound Class: Clean Contaminated    Surgeon: Cari Law MD    Assistant: Jeffery Garcia MD  Anesthesiologist/Type of Anesthesia:  Anesthesiologist: Simeon Zhou M.D./Spinal along with  AA student    Surgical Staff:  Circulator: Annmarie Murrieta R.N.  Scrub Person: Nevin Fulton-Sheryl    Specimens removed if any:  none    Estimated Blood Loss: 300 cc    Findings: Very thin lower uterine segment, male vtx, nuchal cord x 1, apgars 8/9,   Weight 3220g, Intact placenta, 3vc, Bilateral normal ovaries and fallopian tubes. Small paratubal cyst on right tube, counts correct.       Complications: none    Disposition: to RR stable with the baby.         12/15/2023 1:33 PM Cari Law M.D.

## 2023-12-15 NOTE — ANESTHESIA TIME REPORT
Anesthesia Start and Stop Event Times       Date Time Event    12/15/2023 1112 Ready for Procedure     1153 Anesthesia Start     1311 Anesthesia Stop          Responsible Staff  12/15/23      Name Role Begin End    Simeon Zhou M.D. Anesth 1153 1311          Overtime Reason:  no overtime (within assigned shift)    Comments:

## 2023-12-15 NOTE — ANESTHESIA PROCEDURE NOTES
Spinal Block    Date/Time: 12/15/2023 11:59 AM    Performed by: Simeon Zhou M.D.  Authorized by: Simeon Zhou M.D.    Patient Location:  OR  Start Time:  12/15/2023 11:59 AM  End Time:  12/15/2023 12:00 PM  Reason for Block: primary anesthetic    patient identified, IV checked, site marked, risks and benefits discussed, surgical consent, monitors and equipment checked, pre-op evaluation and timeout performed    Patient Position:  Sitting  Prep: ChloraPrep, patient draped and sterile technique    Monitoring:  Blood pressure, continuous pulse oximetry and heart rate  Approach:  Midline  Location:  L4-5  Injection Technique:  Single-shot  Skin infiltration:  Lidocaine  Strength:  1%  Dose:  3ml  Needle Type:  Pencan  Needle Gauge:  25 G  CSF flowing pre/post injection:  Yes  Sensory Level:  T6

## 2023-12-16 PROCEDURE — A9270 NON-COVERED ITEM OR SERVICE: HCPCS | Performed by: ANESTHESIOLOGY

## 2023-12-16 PROCEDURE — 700111 HCHG RX REV CODE 636 W/ 250 OVERRIDE (IP): Mod: JZ | Performed by: ANESTHESIOLOGY

## 2023-12-16 PROCEDURE — A9270 NON-COVERED ITEM OR SERVICE: HCPCS | Performed by: OBSTETRICS & GYNECOLOGY

## 2023-12-16 PROCEDURE — 700102 HCHG RX REV CODE 250 W/ 637 OVERRIDE(OP): Performed by: OBSTETRICS & GYNECOLOGY

## 2023-12-16 PROCEDURE — 700102 HCHG RX REV CODE 250 W/ 637 OVERRIDE(OP): Performed by: ANESTHESIOLOGY

## 2023-12-16 PROCEDURE — 770002 HCHG ROOM/CARE - OB PRIVATE (112)

## 2023-12-16 RX ADMIN — IBUPROFEN 800 MG: 800 TABLET, FILM COATED ORAL at 22:19

## 2023-12-16 RX ADMIN — OXYCODONE HYDROCHLORIDE 10 MG: 10 TABLET ORAL at 18:17

## 2023-12-16 RX ADMIN — ACETAMINOPHEN 1000 MG: 500 TABLET, FILM COATED ORAL at 05:57

## 2023-12-16 RX ADMIN — OXYCODONE HYDROCHLORIDE 10 MG: 10 TABLET ORAL at 06:00

## 2023-12-16 RX ADMIN — OXYCODONE HYDROCHLORIDE 10 MG: 10 TABLET ORAL at 12:05

## 2023-12-16 RX ADMIN — SIMETHICONE 125 MG: 125 TABLET, CHEWABLE ORAL at 06:00

## 2023-12-16 RX ADMIN — ACETAMINOPHEN 1000 MG: 500 TABLET, FILM COATED ORAL at 12:06

## 2023-12-16 RX ADMIN — ACETAMINOPHEN 1000 MG: 500 TABLET, FILM COATED ORAL at 00:04

## 2023-12-16 RX ADMIN — KETOROLAC TROMETHAMINE 30 MG: 30 INJECTION, SOLUTION INTRAMUSCULAR; INTRAVENOUS at 15:51

## 2023-12-16 RX ADMIN — KETOROLAC TROMETHAMINE 30 MG: 30 INJECTION, SOLUTION INTRAMUSCULAR; INTRAVENOUS at 08:20

## 2023-12-16 RX ADMIN — KETOROLAC TROMETHAMINE 30 MG: 30 INJECTION, SOLUTION INTRAMUSCULAR; INTRAVENOUS at 01:55

## 2023-12-16 RX ADMIN — PRENATAL WITH FERROUS FUM AND FOLIC ACID 1 TABLET: 3080; 920; 120; 400; 22; 1.84; 3; 20; 10; 1; 12; 200; 27; 25; 2 TABLET ORAL at 08:21

## 2023-12-16 RX ADMIN — ACETAMINOPHEN 1000 MG: 500 TABLET, FILM COATED ORAL at 18:17

## 2023-12-16 ASSESSMENT — PAIN DESCRIPTION - PAIN TYPE
TYPE: SURGICAL PAIN
TYPE: SURGICAL PAIN
TYPE: SURGICAL PAIN;ACUTE PAIN
TYPE: ACUTE PAIN;SURGICAL PAIN
TYPE: ACUTE PAIN;SURGICAL PAIN
TYPE: SURGICAL PAIN
TYPE: SURGICAL PAIN;ACUTE PAIN
TYPE: SURGICAL PAIN
TYPE: ACUTE PAIN;SURGICAL PAIN

## 2023-12-16 ASSESSMENT — EDINBURGH POSTNATAL DEPRESSION SCALE (EPDS)
I HAVE BEEN ANXIOUS OR WORRIED FOR NO GOOD REASON: NO, NOT AT ALL
I HAVE BEEN SO UNHAPPY THAT I HAVE HAD DIFFICULTY SLEEPING: NOT AT ALL
THE THOUGHT OF HARMING MYSELF HAS OCCURRED TO ME: NEVER
I HAVE FELT SAD OR MISERABLE: NO, NOT AT ALL
THINGS HAVE BEEN GETTING ON TOP OF ME: NO, I HAVE BEEN COPING AS WELL AS EVER
I HAVE BEEN ABLE TO LAUGH AND SEE THE FUNNY SIDE OF THINGS: AS MUCH AS I ALWAYS COULD
I HAVE BEEN SO UNHAPPY THAT I HAVE BEEN CRYING: NO, NEVER
I HAVE LOOKED FORWARD WITH ENJOYMENT TO THINGS: AS MUCH AS I EVER DID
I HAVE FELT SCARED OR PANICKY FOR NO GOOD REASON: NO, NOT AT ALL
I HAVE BLAMED MYSELF UNNECESSARILY WHEN THINGS WENT WRONG: NO, NEVER

## 2023-12-16 NOTE — CARE PLAN
The patient is Stable - Low risk of patient condition declining or worsening    Shift Goals  Clinical Goals: VSS, ambulate, adequate urine output, pain WDL  Patient Goals:   Family Goals:     Progress made toward(s) clinical / shift goals:  VSS, patient able to ambulate to bathroom with standby assistance, pain controlled with scheduled medications, waiting on post-norman void      Problem: Psychosocial - Postpartum  Goal: Patient will verbalize and demonstrate effective bonding and parenting behavior  Outcome: Progressing     Problem: Altered Physiologic Condition  Goal: Patient physiologically stable as evidenced by normal lochia, palpable uterine involution and vitals within normal limits  Outcome: Progressing

## 2023-12-16 NOTE — PROGRESS NOTES
Progress Note    Odette Harris   Post-op day 1  Chief Admitting Dx: History of  section [Z98.891]  Indication for care or intervention related to labor and delivery [O75.9]  Delivery Type:  for repeat      Subjective:  Ambulating: voiding, tolerating diet, normal lochia, pain controlled. No complaints    Vitals:    23 0200 23 0300 23 0400 23 0529   BP: 109/59   115/69   Pulse: 85 88 86 80   Resp: 16  18   Temp: 36.7 °C (98 °F)   36.7 °C (98 °F)   TempSrc: Temporal   Temporal   SpO2: 95% 96% 95% 96%   Weight:       Height:           Exam:  Gen: no acute distress  Abdomen: soft nt/nd firm fundus  Inc: c/d/i with postop dressing  Ext: no calf pain bilateral LE    Labs:   Recent Results (from the past 24 hour(s))   Hold Blood Bank Specimen (Not Tested)    Collection Time: 12/15/23 10:45 AM   Result Value Ref Range    Holding Tube - Bb DONE    CBC with Differential    Collection Time: 12/15/23 10:45 AM   Result Value Ref Range    WBC 9.6 4.8 - 10.8 K/uL    RBC 4.48 4.20 - 5.40 M/uL    Hemoglobin 12.3 12.0 - 16.0 g/dL    Hematocrit 37.0 37.0 - 47.0 %    MCV 82.6 81.4 - 97.8 fL    MCH 27.5 27.0 - 33.0 pg    MCHC 33.2 32.2 - 35.5 g/dL    RDW 44.1 35.9 - 50.0 fL    Platelet Count 257 164 - 446 K/uL    MPV 9.2 9.0 - 12.9 fL    Neutrophils-Polys 68.40 44.00 - 72.00 %    Lymphocytes 24.80 22.00 - 41.00 %    Monocytes 5.00 0.00 - 13.40 %    Eosinophils 0.20 0.00 - 6.90 %    Basophils 0.20 0.00 - 1.80 %    Immature Granulocytes 1.40 (H) 0.00 - 0.90 %    Nucleated RBC 0.00 0.00 - 0.20 /100 WBC    Neutrophils (Absolute) 6.54 1.82 - 7.42 K/uL    Lymphs (Absolute) 2.37 1.00 - 4.80 K/uL    Monos (Absolute) 0.48 0.00 - 0.85 K/uL    Eos (Absolute) 0.02 0.00 - 0.51 K/uL    Baso (Absolute) 0.02 0.00 - 0.12 K/uL    Immature Granulocytes (abs) 0.13 (H) 0.00 - 0.11 K/uL    NRBC (Absolute) 0.00 K/uL   T.PALLIDUM AB LAYO (Syphilis)    Collection Time: 12/15/23 10:45 AM   Result  Value Ref Range    Syphilis, Treponemal Qual Non-Reactive Non-Reactive   CBC without differential- Once in 8 hours post delivery    Collection Time: 12/15/23  8:33 PM   Result Value Ref Range    WBC 14.7 (H) 4.8 - 10.8 K/uL    RBC 4.16 (L) 4.20 - 5.40 M/uL    Hemoglobin 11.3 (L) 12.0 - 16.0 g/dL    Hematocrit 34.6 (L) 37.0 - 47.0 %    MCV 83.2 81.4 - 97.8 fL    MCH 27.2 27.0 - 33.0 pg    MCHC 32.7 32.2 - 35.5 g/dL    RDW 44.3 35.9 - 50.0 fL    Platelet Count 262 164 - 446 K/uL    MPV 9.1 9.0 - 12.9 fL       Assessment:  POD 1 s/p Repeat c/s    Plan:  Routine postop care  Anticipate dc home pod 2-4.   Ambulate  Pain control  Work on breastfeeding    Cari Law M.D.

## 2023-12-16 NOTE — OP REPORT
DATE OF SERVICE:  12/15/2023     PREOPERATIVE DIAGNOSES:  1.  Intrauterine pregnancy at 39 weeks' gestation.  2.  Previous  section, desiring repeat.  3.  Closely spaced pregnancies.     POSTOPERATIVE DIAGNOSES:   1.  Intrauterine pregnancy at 39 weeks' gestation.  2.  Previous  section, desiring repeat.  3.  Closely spaced pregnancies.     PROCEDURE:  Repeat low transverse  section via Pfannenstiel skin   incision.     SURGEON:  Cari Law MD     ASSISTANT:  Jeffery Garcia MD     ANESTHESIOLOGIST:  Simeon Zhou MD     ANESTHESIA:  Spinal, along with AA student that he had with him.     SPECIMENS: None.     ESTIMATED BLOOD LOSS:  300 mL.     URINE OUTPUT: Clear at the end of procedure.     FINDINGS:  Very thin lower uterine segment, male infant, vertex, nuchal cord   x1, loose, delivered through clear amniotic fluid.  Apgars were 8 and 9,   weight 3220 grams, intact placenta, 3-vessel cord.  Bilateral normal-appearing   ovaries and fallopian tubes, small paratubal cyst was seen on the right tube.   Counts were correct and again urine output was clear at the end of the   procedure.     COMPLICATIONS:  None.     DISPOSITION:  The patient and the baby both went to recovery room stable.     DESCRIPTION OF PROCEDURE:  The patient was taken to the operating room where   Dr. Zhou along with his student placed her under spinal anesthesia without   any difficulty.  She was prepped and draped in a normal sterile fashion in   dorsal supine position with leftward tilt.  Fetal heart tones were confirmed   and reassuring prior to the prep.  Timeout was called to identify correct   patient, correct procedure and confirm she got preoperative antibiotics.  A   Pfannenstiel skin incision was made with the scalpel and carried down to the   underlying layer of fascia with a knife.  The fascia was incised in the   midline.  Incision was carried out laterally with Fabian scissors.  The upper   edge of the  fascia was grasped with Kocher clamps, tented up, and the   underlying rectus muscle dissected off bluntly.  The lower edge of the fascia   was grasped with Kocher clamps, tented up, and the underlying rectus muscle   dissected off bluntly.  Rectus muscles were  in the midline.  The   peritoneum was identified and entered and the incision was extended.  The   Derick O retractor was then placed without any difficulty.  The lower uterine   segment was visualized and was extremely thin.  She had a large coursing   uterine vein on the left side of the thin lower uterine segment.  Bladder flap   was created.  The lower uterine segment was incised in a low transverse   fashion with a scalpel and again was extremely thin.  There was clear amniotic   fluid.  The fetus was delivered in cephalic presentation without any   complications.  There was a nuchal cord that was delivered through and reduced   after delivery of the head.  The rest of the body was then delivered   uneventfully, male infant.  Nose and mouth were bulb suctioned.  Delay for   cord clamp 30 seconds, cord was then clamped x2 and cut and the baby was   handed off to waiting respiratory therapist and delivery team nurse.  The   uterus was then cleared of all clots and debris.  The hysterotomy was closed   with 0 chromic in a running locked fashion.  Good hemostasis was assured after   several other figure-of-eight sutures of 0 Vicryl to reinforce it.  Once   hemostasis was assured, bilateral normal fallopian tubes and ovaries seen.    There was a very small paratubal cyst on the right side, which was drained.    Again, hemostasis was assured, prior to removing the Derick O retractor.    Seprafilm was then placed along the anterior uterus as well as the lower   uterine segment.  The peritoneum was then closed with 3-0 Vicryl and the   rectus muscles were reapproximated with 3-0 Vicryl.  The fascia was then   grasped and sewn with 0 Vicryl in a running  locked fashion.  Good hemostasis   was achieved.  The incision was irrigated.  Subcutaneous layer was closed with   3-0 Vicryl and the skin was closed with Monocryl.  Sponge, laps and needle   counts were correct.        ______________________________  MD SIMI ABDI/MAR    DD:  12/15/2023 13:41  DT:  12/15/2023 14:35    Job#:  [id number]

## 2023-12-16 NOTE — LACTATION NOTE
This note was copied from a baby's chart.  Lactation Follow up:    ALEX reports baby initially latched at breast very well, but started to become fussy and spitty overnight, and unable to latch.  She has been hand expressing and syringe feeding colostrum to baby.      Upon entering room, ALEX states baby's last latch was 3+ hrs ago and wanting to attempt to breastfeed.     Baby asleep in bassinet.  With permission unswaddled and placed into laidback nursing position.  Provided pillows to support proper positioning.  Adjusted MOB hands to provide infant's ear, shoulder, hip alignment. Taught hand expression.  MOB hand expressed colostrum into baby's mouth.   Baby burped and gaggy with nasal congestion.  Sat baby up to burp and did leg pedaling and returned baby skin to skin.  Baby immediately asleep.  Will plan to attempt once showing feeding cues or at 3hr lex.      Plan:  Will need to reassess later today. Breastfeed based on early feeding cues. Continue to hand express colostrum and syringe feed if unable to establish latch. Frequent skin to skin. Will create feeding plan this afternoon if unable to latch.      Follow up at 1315:  Baby has continued to be sleepy and has not latched.  MOB is using hand pump and hand expressing colostrum and syringe feeding baby.  Baby continues to be spitty and slight nasal congestion.     Several attempts made to latch.  Only non sustained latch obtained, no audible swallows.      Plan will be to begin pumping q3hr if baby continues to have poor latch.  If baby begins to have sustained latch with audible swallows feeding at least q3hr , pumping can discontinue.     Feeding plan:    1) Breastfeed based on early feeding cues or attempt q 3hr.    3) Pump breasts with [employer] HG breast pump 80 cpm decrease to 60 cpm at 2min.  Suction between 20-40%.  Total time 15min, followed with 1-2min hand expression on each breast.  Repeat q3hr    Discussed how to set up, clean and store pump  parts.    Provided CDC's how to prepare and Store Breastmilk handout    Supplementation with formula or DBM not indicated at this time.  Refeed to baby with Mothers expressed breastsmilk.

## 2023-12-16 NOTE — CARE PLAN
The patient is Stable - Low risk of patient condition declining or worsening    Shift Goals  Clinical Goals: Maintan stable vital signs  Patient Goals: pain control, rest  Family Goals: support      Problem: Infection - Postpartum  Goal: Postpartum patient will be free of signs and symptoms of infection  Outcome: Progressing  Note: Patient has had stable vital signs and showed no signs or symptoms of  infection during shift.     Problem: Respiratory/Oxygenation Function Post-Surgical  Goal: Patient will achieve/maintain normal respiratory rate/effort  Outcome: Progressing  Note: Patient  showed no signs of symptom of respiratory depression throughout  shift.

## 2023-12-16 NOTE — LACTATION NOTE
"This note was copied from a baby's chart.  Initial Consult:     History:  ALEX is 22 y/o  s/p RC/S at 39+0 weeks on 12/15 at 1224. Short interval between pregnancies.       History of BF: MOB pumped and bottlefed expressed milk x 5 months for first baby. She states first baby \"never really latched.\"     Report of Current Breastfeeding Status: At time of assessment, baby was latched in the laid back/cradle position on left breast and sustaining organized suck pattern with occasional swallows. MOB denied discomfort with latch. Breasts appear soft/symmetrical. Nipples intact/pliable.    Discussed how to perform hand expression. Recommended viewing [place] [facility] video on hand expression. Encouraged MOB to reach out to RN if baby is too sleepy to latch at a feeding, as RN can assist with hand expression and spoon feeding.      Provided breastfeeding education on: hunger cues, frequency/duration of breastfeeds, skin to skin, cluster feeding, shallow vs deep latch, and nutritive vs non-nutritive suck.    Reviewed breastfeeding basics and other handouts in orange bag.    MOB has WIC, has a breast pump from [facility]. Encouraged her to call and report delivery of baby.     Plan: Continue to offer infant the breast per feeding cues for a minimum of 8 or more feeds in a 24 hour period, not to exceed 4 hours between feeding attempts. Frequent skin to skin as MOB awake and attentive.           "

## 2023-12-16 NOTE — PROGRESS NOTES
Assessment completed. Fundus firm, lochia light. VSS. Tolerating diet. Alberts in place, incision dressing CDI. Pt states passing gas. Pain controlled with PRN medications per MAR. Will offer pain medications as they become available. FOB at bedside, bonding with patient and baby. POC discussed with patient. Encouraged to call with needs, Call light within reach.

## 2023-12-16 NOTE — CARE PLAN
The patient is Stable - Low risk of patient condition declining or worsening    Shift Goals  Clinical Goals: delivery of baby boy via repeat c/s  Patient Goals: healthy mom and baby  Family Goals: appropriate support from [place]    Progress made toward(s) clinical / shift goals:    Problem: Knowledge Deficit - Postpartum  Goal: Patient will verbalize and demonstrate understanding of self and infant care  Outcome: Progressing     Problem: Psychosocial - Postpartum  Goal: Patient will verbalize and demonstrate effective bonding and parenting behavior  Outcome: Progressing     Problem: Early Mobilization - Post Surgery  Goal: Early mobilization post surgery  Outcome: Progressing       Patient is not progressing towards the following goals:

## 2023-12-16 NOTE — PROGRESS NOTES
0710-  Shift report received from RN. Patient in stable condition.       0830- Assessment completed. Plan of care reviewed, patient verbalized  understanding. Patient indicated will call if pain medication intervention is needed.

## 2023-12-17 PROCEDURE — 770002 HCHG ROOM/CARE - OB PRIVATE (112)

## 2023-12-17 PROCEDURE — 700102 HCHG RX REV CODE 250 W/ 637 OVERRIDE(OP): Performed by: OBSTETRICS & GYNECOLOGY

## 2023-12-17 PROCEDURE — A9270 NON-COVERED ITEM OR SERVICE: HCPCS | Performed by: OBSTETRICS & GYNECOLOGY

## 2023-12-17 RX ADMIN — ACETAMINOPHEN 1000 MG: 500 TABLET, FILM COATED ORAL at 18:07

## 2023-12-17 RX ADMIN — SIMETHICONE 125 MG: 125 TABLET, CHEWABLE ORAL at 06:22

## 2023-12-17 RX ADMIN — OXYCODONE 5 MG: 5 TABLET ORAL at 22:10

## 2023-12-17 RX ADMIN — ACETAMINOPHEN 1000 MG: 500 TABLET, FILM COATED ORAL at 00:16

## 2023-12-17 RX ADMIN — IBUPROFEN 800 MG: 800 TABLET, FILM COATED ORAL at 22:11

## 2023-12-17 RX ADMIN — OXYCODONE HYDROCHLORIDE 10 MG: 10 TABLET ORAL at 04:29

## 2023-12-17 RX ADMIN — IBUPROFEN 800 MG: 800 TABLET, FILM COATED ORAL at 14:08

## 2023-12-17 RX ADMIN — ACETAMINOPHEN 1000 MG: 500 TABLET, FILM COATED ORAL at 12:20

## 2023-12-17 RX ADMIN — ACETAMINOPHEN 1000 MG: 500 TABLET, FILM COATED ORAL at 06:19

## 2023-12-17 RX ADMIN — IBUPROFEN 800 MG: 800 TABLET, FILM COATED ORAL at 06:19

## 2023-12-17 RX ADMIN — PRENATAL WITH FERROUS FUM AND FOLIC ACID 1 TABLET: 3080; 920; 120; 400; 22; 1.84; 3; 20; 10; 1; 12; 200; 27; 25; 2 TABLET ORAL at 08:20

## 2023-12-17 RX ADMIN — OXYCODONE 5 MG: 5 TABLET ORAL at 08:18

## 2023-12-17 ASSESSMENT — PAIN DESCRIPTION - PAIN TYPE
TYPE: ACUTE PAIN;SURGICAL PAIN
TYPE: ACUTE PAIN
TYPE: ACUTE PAIN;SURGICAL PAIN

## 2023-12-17 NOTE — CARE PLAN
The patient is Stable - Low risk of patient condition declining or worsening    Shift Goals  Clinical Goals: VSS,light lochia,incision dressing clean,dry and intact,  Patient Goals: breast feed,pain control  Family Goals: rest,breast feed or use breast pump    Progress made toward(s) clinical / shift goals:  progressing    Patient is not progressing towards the following goals:

## 2023-12-17 NOTE — CARE PLAN
The patient is Stable - Low risk of patient condition declining or worsening      Problem: Psychosocial - Postpartum  Goal: Patient will verbalize and demonstrate effective bonding and parenting behavior  Outcome: Progressing  Note: Patient demonstrated proper care of self and infant during shift.      Problem: Infection - Postpartum  Goal: Postpartum patient will be free of signs and symptoms of infection  Outcome: Progressing  Note: Patient  showed no signs or symptom of infection throughout shift. Vital signs remained  WNL throughout shift.

## 2023-12-17 NOTE — LACTATION NOTE
"This note was copied from a baby's chart.  Follow-up visit:    MOB is 24 y/o  s/p RC/S at 39+0 weeks on 12/15 at 1224. Short interval between pregnancies. Pumped and bottlefed prior baby expressed breastmilk x5 months, stating 1st baby \"never really latched.\"    Baby boy BW 3220 g, DOL2 with 5.5% wt loss.    Baby at bedside asleep and swaddled x2, good color/tone. MOB pumping with [employer] hand pump.    Baby latched and fed well after delivery, but since has been spitty and has not latched well. He also has been congested, but MOB reports that is improving.    MOB currently offering breast on infant cues, following with pumping and bottlefeeding expressed milk if baby doesn't feed well. Pumping transitional milk at this time, 30mLs per session. Denies nipple pain. She is using bedside hospital grade pump to pump most of the time.    MOB states she will keep attempting to latch baby for now. Declines additional latch assistance/assessment at this time. Encouraged pt to ask RN to page lactation if she would like this at future feeding.    MOB has WIC. Encouraged her to make use of [facility] lactation support services on discharge, as well.    Plan: Progression to breastfeeding discussed with mother. Outlined the supportive measures that should be in place for now, to include skin to skin and other basic strategies, hand expression and intrinsic factors (smell, touch, sight and visualization). Encouraged parents to wake baby every few hours and stimulate him to provide opportunities to learn, explore and practice techniques. Plan for this couplet: Pump every 2-3 hours to stimulate milk production (with Hospital Grade Pump). Hand Expression reviewed and encouraged to practice technique.Feed baby any colostrum that is expressed, referring to appropriate supplement guidelines. Use donor breast milk per parent preference as needed to augment amount of supplement if mothers own milk an inadequate amount. Mother may choose to " continue latching practice in between pumping, hand expressing and bottle supplementing.

## 2023-12-17 NOTE — PROGRESS NOTES
0710-Shift report received  from Leidy STOKES. Patient in stable condition  assessment complete plan of care reviewed patient verbalized understanding will call with any needs. Call light in place bed in lowest  position personal  belongings within reach.    0900-  Assessment completed. Plan of care reviewed, patient verbalized  understanding. Patient indicated will call if pain medication intervention is needed.

## 2023-12-17 NOTE — PROGRESS NOTES
Hospital Day : 2    S: doing well; gerardo diet; min bleed    O:  Vitals:    12/16/23 0529 12/16/23 0959 12/16/23 1800 12/17/23 0547   BP: 115/69 98/70 118/63 111/68   Pulse: 80 81 96 98   Resp: 18 18 18 16   Temp: 36.7 °C (98 °F) 37 °C (98.6 °F) 36.9 °C (98.5 °F) 37.2 °C (98.9 °F)   TempSrc: Temporal Temporal Temporal Temporal   SpO2: 96% 97% 96% 97%   Weight:       Height:           Recent Labs     12/15/23  1045 12/15/23  2033   WBC 9.6 14.7*   RBC 4.48 4.16*   HEMOGLOBIN 12.3 11.3*   HEMATOCRIT 37.0 34.6*   MCV 82.6 83.2   MCH 27.5 27.2   MCHC 33.2 32.7   RDW 44.1 44.3   PLATELETCT 257 262   MPV 9.2 9.1             Abd soft ff; cdi    A: pod 2 sp rltcs doing well    P: cpm; am dc

## 2023-12-18 ENCOUNTER — PHARMACY VISIT (OUTPATIENT)
Dept: PHARMACY | Facility: MEDICAL CENTER | Age: 23
End: 2023-12-18
Payer: MEDICARE

## 2023-12-18 VITALS
HEART RATE: 88 BPM | DIASTOLIC BLOOD PRESSURE: 68 MMHG | WEIGHT: 139 LBS | OXYGEN SATURATION: 96 % | BODY MASS INDEX: 28.02 KG/M2 | TEMPERATURE: 99.2 F | HEIGHT: 59 IN | RESPIRATION RATE: 17 BRPM | SYSTOLIC BLOOD PRESSURE: 121 MMHG

## 2023-12-18 PROCEDURE — 700102 HCHG RX REV CODE 250 W/ 637 OVERRIDE(OP): Performed by: OBSTETRICS & GYNECOLOGY

## 2023-12-18 PROCEDURE — RXMED WILLOW AMBULATORY MEDICATION CHARGE: Performed by: OBSTETRICS & GYNECOLOGY

## 2023-12-18 PROCEDURE — A9270 NON-COVERED ITEM OR SERVICE: HCPCS | Performed by: OBSTETRICS & GYNECOLOGY

## 2023-12-18 RX ORDER — IBUPROFEN 800 MG/1
800 TABLET ORAL EVERY 8 HOURS PRN
Qty: 30 TABLET | Refills: 1 | Status: SHIPPED | OUTPATIENT
Start: 2023-12-19

## 2023-12-18 RX ADMIN — IBUPROFEN 800 MG: 800 TABLET, FILM COATED ORAL at 14:03

## 2023-12-18 RX ADMIN — PRENATAL WITH FERROUS FUM AND FOLIC ACID 1 TABLET: 3080; 920; 120; 400; 22; 1.84; 3; 20; 10; 1; 12; 200; 27; 25; 2 TABLET ORAL at 07:44

## 2023-12-18 RX ADMIN — ACETAMINOPHEN 1000 MG: 500 TABLET, FILM COATED ORAL at 07:12

## 2023-12-18 RX ADMIN — ACETAMINOPHEN 1000 MG: 500 TABLET, FILM COATED ORAL at 01:14

## 2023-12-18 RX ADMIN — ACETAMINOPHEN 1000 MG: 500 TABLET, FILM COATED ORAL at 12:21

## 2023-12-18 RX ADMIN — ACETAMINOPHEN 1000 MG: 500 TABLET, FILM COATED ORAL at 18:24

## 2023-12-18 RX ADMIN — IBUPROFEN 800 MG: 800 TABLET, FILM COATED ORAL at 07:12

## 2023-12-18 ASSESSMENT — PAIN DESCRIPTION - PAIN TYPE
TYPE: ACUTE PAIN;SURGICAL PAIN
TYPE: ACUTE PAIN;SURGICAL PAIN

## 2023-12-18 NOTE — LACTATION NOTE
This note was copied from a baby's chart.  Follow up lactation support :  Mom has been pumping and providing a bottle for her baby. LC reviewed basic pump plan and reviewed supplemental feeding guidelines and volumes to provide according to DOL and weight. Mm has an electric pump but was using her [place] manual pump when LC arrived.   LC discussed pumping a minimum of 8 times times after baby feeds. Milk that mom pumped can be used for then next feed session. [facility] collection and storage guidelines were given to mother.  Mom has no questions or concerns for this LC.  Mom is enrolled in [facility]

## 2023-12-18 NOTE — PROGRESS NOTES
Hospital Day : 3    S: doing well; gerardo diet; good pain control; pumping    O:  Vitals:    12/16/23 0959 12/16/23 1800 12/17/23 0547 12/18/23 0507   BP: 98/70 118/63 111/68 109/68   Pulse: 81 96 98 87   Resp: 18 18 16 17   Temp: 37 °C (98.6 °F) 36.9 °C (98.5 °F) 37.2 °C (98.9 °F) 36.8 °C (98.3 °F)   TempSrc: Temporal Temporal Temporal Temporal   SpO2: 97% 96% 97% 98%   Weight:       Height:           Recent Labs     12/15/23  1045 12/15/23  2033   WBC 9.6 14.7*   RBC 4.48 4.16*   HEMOGLOBIN 12.3 11.3*   HEMATOCRIT 37.0 34.6*   MCV 82.6 83.2   MCH 27.5 27.2   MCHC 33.2 32.7   RDW 44.1 44.3   PLATELETCT 257 262   MPV 9.2 9.1             Abd soft ff; cdi    A: pod 3 sp rltcs doing well    P: dc

## 2023-12-18 NOTE — CARE PLAN
The patient is Stable - Low risk of patient condition declining or worsening    Shift Goals  Clinical Goals: VSS  Patient Goals: pain management  Family Goals: rest, bonding    Progress made toward(s) clinical / shift goals:  VSS    Patient is not progressing towards the following goals:

## 2023-12-18 NOTE — CARE PLAN
Problem: Infection - Postpartum  Goal: Postpartum patient will be free of signs and symptoms of infection  Outcome: Progressing     Problem: Early Mobilization - Post Surgery  Goal: Early mobilization post surgery  Outcome: Progressing   The patient is Stable - Low risk of patient condition declining or worsening    Shift Goals  Clinical Goals: VSS  Patient Goals: pain management  Family Goals: rest, bonding    Progress made toward(s) clinical / shift goals:  Pt is not displaying symptoms of maternal infection at this time. All vitals WDL. Pt is up ad luis and ambulating self.

## 2023-12-18 NOTE — DISCHARGE INSTRUCTIONS

## 2023-12-18 NOTE — DISCHARGE SUMMARY
DATE OF ADMISSION:  12/15/2023   DATE OF DISCHARGE:  2023     OB-GYN DISCHARGE SUMMARY     ADMITTING DIAGNOSES:  1.  Pregnancy at term.  2.  Previous .  3.  Desires repeat .     DISCHARGE DIAGNOSES:  1.  Pregnancy at term.  2.  Previous .  3.  Desires repeat .  4.  Status post repeat low transverse .     HOSPITAL COURSE IN DETAIL:  The patient was admitted on the aforementioned   date with the aforementioned diagnoses.  Repeat low transverse  was   performed without complication.  Findings include a male infant with Apgars of   8 and 9, patient and baby in recovery in stable condition.  On postoperative   day #1, she is doing well without complaint, tolerating clears.  By   postoperative day #2, she is tolerating regular diet.  Today, postoperative   day #3, she desires discharge home.  She is afebrile.  Her vital signs are   within normal limits.  Her abdomen is soft with full fundus below umbilicus.    Wound is clean, dry and intact.  No erythema, induration or discharge.     ASSESSMENT PLAN:  At this time, postop day #3 status post repeat low   transverse , doing well, desires discharge home.     PLAN:  At this time,  1.  Discharge home.  2.  Follow up in 6 weeks.  3.  Pelvic rest.  4.  Lifting precautions.  5.  Scripts for Motrin consented and written.           ______________________________  MD JIM Marcos/ANA ROSA    DD:  2023 06:27  DT:  2023 06:44    Job#:  [id number]

## 2023-12-18 NOTE — PROGRESS NOTES
1845 Obtained report from day shift nurse Bossman Sullivan Pt assessment completed. No abnormal findings noted. All pt needs and questions addressed at this time.

## 2025-08-14 ENCOUNTER — HOSPITAL ENCOUNTER (EMERGENCY)
Facility: MEDICAL CENTER | Age: 25
End: 2025-08-14
Attending: EMERGENCY MEDICINE
Payer: COMMERCIAL

## 2025-08-14 VITALS
OXYGEN SATURATION: 99 % | HEART RATE: 75 BPM | RESPIRATION RATE: 18 BRPM | DIASTOLIC BLOOD PRESSURE: 62 MMHG | BODY MASS INDEX: 25.33 KG/M2 | HEIGHT: 59 IN | SYSTOLIC BLOOD PRESSURE: 110 MMHG | TEMPERATURE: 98.6 F | WEIGHT: 125.66 LBS

## 2025-08-14 DIAGNOSIS — N30.00 ACUTE CYSTITIS WITHOUT HEMATURIA: Primary | ICD-10-CM

## 2025-08-14 LAB
APPEARANCE UR: ABNORMAL
BACTERIA #/AREA URNS HPF: ABNORMAL /HPF
BILIRUB UR QL STRIP.AUTO: NEGATIVE
CASTS URNS QL MICRO: ABNORMAL /LPF (ref 0–2)
COLOR UR: YELLOW
EPITHELIAL CELLS 1715: ABNORMAL /HPF (ref 0–5)
GLUCOSE UR STRIP.AUTO-MCNC: NEGATIVE MG/DL
HCG UR QL: NEGATIVE
KETONES UR STRIP.AUTO-MCNC: NEGATIVE MG/DL
LEUKOCYTE ESTERASE UR QL STRIP.AUTO: ABNORMAL
MICRO URNS: ABNORMAL
NITRITE UR QL STRIP.AUTO: NEGATIVE
PH UR STRIP.AUTO: 7 [PH] (ref 5–8)
PROT UR QL STRIP: NEGATIVE MG/DL
RBC # URNS HPF: ABNORMAL /HPF
RBC UR QL AUTO: NEGATIVE
SP GR UR STRIP.AUTO: 1
UROBILINOGEN UR STRIP.AUTO-MCNC: 0.2 EU/DL
WBC #/AREA URNS HPF: ABNORMAL /HPF

## 2025-08-14 PROCEDURE — 81025 URINE PREGNANCY TEST: CPT

## 2025-08-14 PROCEDURE — A9270 NON-COVERED ITEM OR SERVICE: HCPCS | Performed by: EMERGENCY MEDICINE

## 2025-08-14 PROCEDURE — 700102 HCHG RX REV CODE 250 W/ 637 OVERRIDE(OP): Performed by: EMERGENCY MEDICINE

## 2025-08-14 PROCEDURE — 87591 N.GONORRHOEAE DNA AMP PROB: CPT

## 2025-08-14 PROCEDURE — 99283 EMERGENCY DEPT VISIT LOW MDM: CPT

## 2025-08-14 PROCEDURE — 87491 CHLMYD TRACH DNA AMP PROBE: CPT

## 2025-08-14 PROCEDURE — 87086 URINE CULTURE/COLONY COUNT: CPT

## 2025-08-14 PROCEDURE — 81001 URINALYSIS AUTO W/SCOPE: CPT

## 2025-08-14 RX ORDER — NITROFURANTOIN 25; 75 MG/1; MG/1
100 CAPSULE ORAL ONCE
Status: COMPLETED | OUTPATIENT
Start: 2025-08-14 | End: 2025-08-14

## 2025-08-14 RX ORDER — NITROFURANTOIN 25; 75 MG/1; MG/1
100 CAPSULE ORAL 2 TIMES DAILY
Qty: 14 CAPSULE | Refills: 0 | Status: ACTIVE | OUTPATIENT
Start: 2025-08-14 | End: 2025-08-21

## 2025-08-14 RX ORDER — PHENAZOPYRIDINE HYDROCHLORIDE 200 MG/1
200 TABLET, FILM COATED ORAL ONCE
Status: COMPLETED | OUTPATIENT
Start: 2025-08-14 | End: 2025-08-14

## 2025-08-14 RX ORDER — PHENAZOPYRIDINE HYDROCHLORIDE 200 MG/1
200 TABLET, FILM COATED ORAL 3 TIMES DAILY PRN
Qty: 6 TABLET | Refills: 0 | Status: SHIPPED | OUTPATIENT
Start: 2025-08-14

## 2025-08-14 RX ADMIN — PHENAZOPYRIDINE 200 MG: 200 TABLET ORAL at 12:11

## 2025-08-14 RX ADMIN — NITROFURANTOIN (MONOHYDRATE/MACROCRYSTALS) 100 MG: 25; 75 CAPSULE ORAL at 12:11

## 2025-08-15 LAB
C TRACH DNA SPEC QL NAA+PROBE: NEGATIVE
N GONORRHOEA DNA SPEC QL NAA+PROBE: NEGATIVE
SPECIMEN SOURCE: NORMAL

## 2025-08-17 LAB
BACTERIA UR CULT: NORMAL
SIGNIFICANT IND 70042: NORMAL
SITE SITE: NORMAL
SOURCE SOURCE: NORMAL

## (undated) DEVICE — DRESSING POST OP BORDER 4 X 10 (5EA/BX)

## (undated) DEVICE — HEAD HOLDER JUNIOR/ADULT

## (undated) DEVICE — GLOVE BIOGEL SZ 6.5 SURGICAL PF LTX (50PR/BX 4BX/CA)

## (undated) DEVICE — GLOVE BIOGEL INDICATOR SZ 7SURGICAL PF LTX - (50/BX 4BX/CA)

## (undated) DEVICE — KIT  I.V. START (100EA/CA)

## (undated) DEVICE — TRAY SPINAL ANESTHESIA NON-SAFETY (10/CA)

## (undated) DEVICE — TUBING CLEARLINK DUO-VENT - C-FLO (48EA/CA)

## (undated) DEVICE — ELECTRODE DUAL RETURN W/ CORD - (50/PK)

## (undated) DEVICE — SET EXTENSION WITH 2 PORTS (48EA/CA) ***PART #2C8610 IS A SUBSTITUTE*****

## (undated) DEVICE — CATHETER IV NON-SAFETY 18 GA X 1 1/4 (50/BX 4BX/CA)

## (undated) DEVICE — SUTURE 0 CHROMIC CT-1 - (36/BX)

## (undated) DEVICE — PACK C-SECTION (2EA/CA)

## (undated) DEVICE — SUTURE 3-0 VICRYL PLUS CT-1 - 36 INCH (36/BX)

## (undated) DEVICE — WATER IRRIGATION STERILE 1000ML (12EA/CA)

## (undated) DEVICE — RETRACTOR O C SECTION LRY - (5/BX)

## (undated) DEVICE — SODIUM CHL IRRIGATION 0.9% 1000ML (12EA/CA)

## (undated) DEVICE — SUTURE 0 VICRYL PLUS CT-1 - 36 INCH (36/BX)

## (undated) DEVICE — SUTURE 0 36 CHROMIC GUT CTX (36PK/BX)